# Patient Record
Sex: FEMALE | Race: WHITE | Employment: UNEMPLOYED | URBAN - METROPOLITAN AREA
[De-identification: names, ages, dates, MRNs, and addresses within clinical notes are randomized per-mention and may not be internally consistent; named-entity substitution may affect disease eponyms.]

---

## 2022-04-25 ENCOUNTER — APPOINTMENT (OUTPATIENT)
Dept: CT IMAGING | Age: 57
End: 2022-04-25

## 2022-04-25 ENCOUNTER — HOSPITAL ENCOUNTER (EMERGENCY)
Age: 57
Discharge: HOME | End: 2022-04-25
Attending: STUDENT IN AN ORGANIZED HEALTH CARE EDUCATION/TRAINING PROGRAM

## 2022-04-25 VITALS
HEART RATE: 109 BPM | SYSTOLIC BLOOD PRESSURE: 144 MMHG | BODY MASS INDEX: 26.46 KG/M2 | TEMPERATURE: 97.5 F | OXYGEN SATURATION: 99 % | HEIGHT: 64 IN | DIASTOLIC BLOOD PRESSURE: 100 MMHG | WEIGHT: 155 LBS | RESPIRATION RATE: 16 BRPM

## 2022-04-25 DIAGNOSIS — C56.9 MALIGNANT NEOPLASM OF OVARY, UNSPECIFIED LATERALITY (HCC): Primary | ICD-10-CM

## 2022-04-25 DIAGNOSIS — N39.0 URINARY TRACT INFECTION WITHOUT HEMATURIA, SITE UNSPECIFIED: ICD-10-CM

## 2022-04-25 LAB
A/G RATIO: 0.8 (ref 1.1–2.2)
ALBUMIN SERPL-MCNC: 3 G/DL (ref 3.4–5)
ALP BLD-CCNC: 73 U/L (ref 40–129)
ALT SERPL-CCNC: <5 U/L (ref 10–40)
AMMONIA: 12 UMOL/L (ref 11–51)
ANION GAP SERPL CALCULATED.3IONS-SCNC: 15 MMOL/L (ref 3–16)
AST SERPL-CCNC: 8 U/L (ref 15–37)
BACTERIA: ABNORMAL /HPF
BASOPHILS ABSOLUTE: 0.1 K/UL (ref 0–0.2)
BASOPHILS RELATIVE PERCENT: 1.4 %
BILIRUB SERPL-MCNC: <0.2 MG/DL (ref 0–1)
BILIRUBIN URINE: ABNORMAL
BLOOD, URINE: NEGATIVE
BUN BLDV-MCNC: 7 MG/DL (ref 7–20)
CALCIUM SERPL-MCNC: 8.7 MG/DL (ref 8.3–10.6)
CHLORIDE BLD-SCNC: 97 MMOL/L (ref 99–110)
CLARITY: CLEAR
CO2: 26 MMOL/L (ref 21–32)
COLOR: YELLOW
CREAT SERPL-MCNC: 0.7 MG/DL (ref 0.6–1.1)
EOSINOPHILS ABSOLUTE: 0 K/UL (ref 0–0.6)
EOSINOPHILS RELATIVE PERCENT: 0.4 %
EPITHELIAL CELLS, UA: ABNORMAL /HPF (ref 0–5)
GFR AFRICAN AMERICAN: >60
GFR NON-AFRICAN AMERICAN: >60
GLUCOSE BLD-MCNC: 92 MG/DL (ref 70–99)
GLUCOSE URINE: NEGATIVE MG/DL
HCG QUALITATIVE: NEGATIVE
HCT VFR BLD CALC: 34.6 % (ref 36–48)
HEMOGLOBIN: 11.4 G/DL (ref 12–16)
KETONES, URINE: ABNORMAL MG/DL
LEUKOCYTE ESTERASE, URINE: ABNORMAL
LIPASE: 16 U/L (ref 13–60)
LYMPHOCYTES ABSOLUTE: 1.8 K/UL (ref 1–5.1)
LYMPHOCYTES RELATIVE PERCENT: 17.5 %
MAGNESIUM: 2.2 MG/DL (ref 1.8–2.4)
MCH RBC QN AUTO: 25.7 PG (ref 26–34)
MCHC RBC AUTO-ENTMCNC: 32.9 G/DL (ref 31–36)
MCV RBC AUTO: 78 FL (ref 80–100)
MICROSCOPIC EXAMINATION: YES
MONOCYTES ABSOLUTE: 0.7 K/UL (ref 0–1.3)
MONOCYTES RELATIVE PERCENT: 6.9 %
MUCUS: ABNORMAL /LPF
NEUTROPHILS ABSOLUTE: 7.6 K/UL (ref 1.7–7.7)
NEUTROPHILS RELATIVE PERCENT: 73.8 %
NITRITE, URINE: POSITIVE
PDW BLD-RTO: 20.4 % (ref 12.4–15.4)
PH UA: 5.5 (ref 5–8)
PLATELET # BLD: 596 K/UL (ref 135–450)
PMV BLD AUTO: 6.9 FL (ref 5–10.5)
POTASSIUM REFLEX MAGNESIUM: 3.1 MMOL/L (ref 3.5–5.1)
PROTEIN UA: 30 MG/DL
RBC # BLD: 4.44 M/UL (ref 4–5.2)
RBC UA: ABNORMAL /HPF (ref 0–4)
SODIUM BLD-SCNC: 138 MMOL/L (ref 136–145)
SPECIFIC GRAVITY UA: 1.02 (ref 1–1.03)
TOTAL PROTEIN: 6.7 G/DL (ref 6.4–8.2)
URINE REFLEX TO CULTURE: ABNORMAL
URINE TYPE: ABNORMAL
UROBILINOGEN, URINE: 1 E.U./DL
WBC # BLD: 10.3 K/UL (ref 4–11)
WBC UA: ABNORMAL /HPF (ref 0–5)

## 2022-04-25 PROCEDURE — 84703 CHORIONIC GONADOTROPIN ASSAY: CPT

## 2022-04-25 PROCEDURE — 80053 COMPREHEN METABOLIC PANEL: CPT

## 2022-04-25 PROCEDURE — 6360000002 HC RX W HCPCS: Performed by: STUDENT IN AN ORGANIZED HEALTH CARE EDUCATION/TRAINING PROGRAM

## 2022-04-25 PROCEDURE — 6370000000 HC RX 637 (ALT 250 FOR IP): Performed by: STUDENT IN AN ORGANIZED HEALTH CARE EDUCATION/TRAINING PROGRAM

## 2022-04-25 PROCEDURE — 83735 ASSAY OF MAGNESIUM: CPT

## 2022-04-25 PROCEDURE — 74177 CT ABD & PELVIS W/CONTRAST: CPT

## 2022-04-25 PROCEDURE — C9113 INJ PANTOPRAZOLE SODIUM, VIA: HCPCS | Performed by: STUDENT IN AN ORGANIZED HEALTH CARE EDUCATION/TRAINING PROGRAM

## 2022-04-25 PROCEDURE — 2500000003 HC RX 250 WO HCPCS: Performed by: STUDENT IN AN ORGANIZED HEALTH CARE EDUCATION/TRAINING PROGRAM

## 2022-04-25 PROCEDURE — 99285 EMERGENCY DEPT VISIT HI MDM: CPT

## 2022-04-25 PROCEDURE — 82140 ASSAY OF AMMONIA: CPT

## 2022-04-25 PROCEDURE — 85025 COMPLETE CBC W/AUTO DIFF WBC: CPT

## 2022-04-25 PROCEDURE — 96365 THER/PROPH/DIAG IV INF INIT: CPT

## 2022-04-25 PROCEDURE — 36415 COLL VENOUS BLD VENIPUNCTURE: CPT

## 2022-04-25 PROCEDURE — 96368 THER/DIAG CONCURRENT INF: CPT

## 2022-04-25 PROCEDURE — 81001 URINALYSIS AUTO W/SCOPE: CPT

## 2022-04-25 PROCEDURE — 2580000003 HC RX 258: Performed by: STUDENT IN AN ORGANIZED HEALTH CARE EDUCATION/TRAINING PROGRAM

## 2022-04-25 PROCEDURE — 96366 THER/PROPH/DIAG IV INF ADDON: CPT

## 2022-04-25 PROCEDURE — 6360000004 HC RX CONTRAST MEDICATION: Performed by: STUDENT IN AN ORGANIZED HEALTH CARE EDUCATION/TRAINING PROGRAM

## 2022-04-25 PROCEDURE — 83690 ASSAY OF LIPASE: CPT

## 2022-04-25 PROCEDURE — 96375 TX/PRO/DX INJ NEW DRUG ADDON: CPT

## 2022-04-25 RX ORDER — OMEPRAZOLE 40 MG/1
40 CAPSULE, DELAYED RELEASE ORAL
Qty: 30 CAPSULE | Refills: 0 | Status: SHIPPED | OUTPATIENT
Start: 2022-04-25

## 2022-04-25 RX ORDER — CEFUROXIME AXETIL 250 MG/1
250 TABLET ORAL 2 TIMES DAILY
Qty: 14 TABLET | Refills: 0 | Status: SHIPPED | OUTPATIENT
Start: 2022-04-25 | End: 2022-05-02

## 2022-04-25 RX ORDER — MAGNESIUM SULFATE 1 G/100ML
1000 INJECTION INTRAVENOUS ONCE
Status: COMPLETED | OUTPATIENT
Start: 2022-04-25 | End: 2022-04-25

## 2022-04-25 RX ORDER — POTASSIUM CHLORIDE 20 MEQ/1
40 TABLET, EXTENDED RELEASE ORAL ONCE
Status: COMPLETED | OUTPATIENT
Start: 2022-04-25 | End: 2022-04-25

## 2022-04-25 RX ORDER — ONDANSETRON 4 MG/1
4 TABLET, ORALLY DISINTEGRATING ORAL 3 TIMES DAILY PRN
Qty: 21 TABLET | Refills: 0 | Status: SHIPPED | OUTPATIENT
Start: 2022-04-25

## 2022-04-25 RX ADMIN — IOPAMIDOL 75 ML: 755 INJECTION, SOLUTION INTRAVENOUS at 19:36

## 2022-04-25 RX ADMIN — FAMOTIDINE 20 MG: 10 INJECTION INTRAVENOUS at 23:00

## 2022-04-25 RX ADMIN — POTASSIUM CHLORIDE 40 MEQ: 1500 TABLET, EXTENDED RELEASE ORAL at 22:57

## 2022-04-25 RX ADMIN — CEFTRIAXONE SODIUM 1000 MG: 1 INJECTION, POWDER, FOR SOLUTION INTRAMUSCULAR; INTRAVENOUS at 21:24

## 2022-04-25 RX ADMIN — MAGNESIUM SULFATE HEPTAHYDRATE 1000 MG: 1 INJECTION, SOLUTION INTRAVENOUS at 22:03

## 2022-04-25 RX ADMIN — Medication 40 MG: at 22:57

## 2022-04-25 ASSESSMENT — PAIN - FUNCTIONAL ASSESSMENT: PAIN_FUNCTIONAL_ASSESSMENT: NONE - DENIES PAIN

## 2022-04-25 NOTE — Clinical Note
Neal Harris was seen and treated in our emergency department on 4/25/2022. She may return to work on 04/27/2022. If you have any questions or concerns, please don't hesitate to call.       Francisco Rodriguez, DO

## 2022-04-25 NOTE — ED PROVIDER NOTES
[address] ED      CHIEF COMPLAINT  Bloated (pt c/o abdominal bloating for a couple months; HX of alcoholism, has not drank in 8 months; buring to throat; pain 0/10; stomach feels tight )       HISTORY OF PRESENT ILLNESS  Leonardo Reese is a 64 y.o. female  who presents to the ED complaining of increasing abdominal swelling for the last 2 months. States that she did drink heavily in the past, but has not drank in several months. She also reports burning in her throat and stomach when she eats and drinks. She does often have emesis afterwards, usually clear and yellow in color. Denies any black or bloody stools. Does admit to constipation. Denies any fevers, chills, chest pain, shortness of breath. No significant abdominal pain. No other complaints, modifying factors or associated symptoms. I have reviewed the following from the nursing documentation. History reviewed. No pertinent past medical history. History reviewed. No pertinent surgical history. History reviewed. No pertinent family history. Social History     Socioeconomic History    Marital status:      Spouse name: Not on file    Number of children: Not on file    Years of education: Not on file    Highest education level: Not on file   Occupational History    Not on file   Tobacco Use    Smoking status: Current Every Day Smoker     Packs/day: 1.50     Types: Cigarettes    Smokeless tobacco: Never Used   Substance and Sexual Activity    Alcohol use:  Yes     Alcohol/week: 8.0 standard drinks     Types: 8 Cans of beer per week    Drug use: No    Sexual activity: Not on file   Other Topics Concern    Not on file   Social History Narrative    Not on file     Social Determinants of Health     Financial Resource Strain:     Difficulty of Paying Living Expenses: Not on file   Food Insecurity:     Worried About Running Out of Food in the Last Year: Not on file    Marlin of Food in the Last Year: Not on file Transportation Needs:     Lack of Transportation (Medical): Not on file    Lack of Transportation (Non-Medical): Not on file   Physical Activity:     Days of Exercise per Week: Not on file    Minutes of Exercise per Session: Not on file   Stress:     Feeling of Stress : Not on file   Social Connections:     Frequency of Communication with Friends and Family: Not on file    Frequency of Social Gatherings with Friends and Family: Not on file    Attends Worship Services: Not on file    Active Member of [address] [place] [address] [email or web address] or Organizations: Not on file    Attends Club or Organization Meetings: Not on file    Marital Status: Not on file   Intimate Partner Violence:     Fear of Current or Ex-Partner: Not on file    Emotionally Abused: Not on file    Physically Abused: Not on file    Sexually Abused: Not on file   Housing Stability:     Unable to Pay for Housing in the Last Year: Not on file    Number of [place] in the Last Year: Not on file    Unstable Housing in the Last Year: Not on file     No current facility-administered medications for this encounter. No current outpatient medications on file. Allergies   Allergen Reactions    Azithromycin Hives    Codeine      Causes syncope       REVIEW OF SYSTEMS  10 systems reviewed, pertinent positives per HPI otherwise noted to be negative. PHYSICAL EXAM  BP (!) 148/89   Pulse 118   Temp 97.5 °F (36.4 °C) (Oral)   Resp 18   Ht 5' 4\" (1.626 m)   Wt 155 lb (70.3 kg)   LMP 04/25/2016   SpO2 100%   BMI 26.61 kg/m²    General: Appears well. Alert  HEENT: Head atraumatic, Eyes normal inspection, PERRL. Normal ENT inspection, Pharynx normal. No signs of dehydration  NECK: Normal inspection  RESPIRATORY: Normal breath sounds. No chest wall tenderness. No respiratory distress  CVS: Heart rate and rhythm regular. No Murmurs  ABDOMEN/GI: Severely distended, non-tender  BACK: Normal inspection  EXTREMITIES: Non-Tender. Full ROM. Normal appearance.  No cystic mass in the abdomen concerning for ovarian malignancy. Discussed with on-call OB/GYN, who recommended referral to Dr. Kenton Paulino at [facility]. They will give the patient's information to the office at [facility] and the patient is given referral to Dr. Kenton Paulino. Due to her nausea and reflux, she is given Pepcid and pantoprazole in the ED. She is given a prescription for pantoprazole and Zofran. I did discuss the results in detail with the patient and her . She is agreeable with follow-up and will call the gynecologic oncology office tomorrow. During the patient's ED course, the patient was given:  Medications   magnesium sulfate 1000 mg in dextrose 5% 100 mL IVPB (1,000 mg IntraVENous New Bag 4/25/22 2203)   potassium chloride (KLOR-CON M) extended release tablet 40 mEq (has no administration in time range)   famotidine (PEPCID) 20 mg in sodium chloride (PF) 10 mL injection (has no administration in time range)   pantoprazole (PROTONIX) 40 mg in sodium chloride (PF) 10 mL injection (has no administration in time range)   iopamidol (ISOVUE-370) 76 % injection 75 mL (75 mLs IntraVENous Given 4/25/22 1936)   cefTRIAXone (ROCEPHIN) 1000 mg IVPB in 50 mL D5W minibag (1,000 mg IntraVENous New Bag 4/25/22 2124)        CLINICAL IMPRESSION  1. Malignant neoplasm of ovary, unspecified laterality ([address]    2. Urinary tract infection without hematuria, site unspecified        Blood pressure (!) 148/89, pulse 118, temperature 97.5 °F (36.4 °C), temperature source Oral, resp. rate 18, height 5' 4\" (1.626 m), weight 155 lb (70.3 kg), last menstrual period 04/25/2016, SpO2 100 %. Felicia Rod was discharged to home in stable condition. Patient was given scripts for the following medications. I counseled patient how to take these medications.    New Prescriptions    CEFUROXIME (CEFTIN) 250 MG TABLET    Take 1 tablet by mouth 2 times daily for 7 days    OMEPRAZOLE (PRILOSEC) 40 MG DELAYED RELEASE CAPSULE Take 1 capsule by mouth every morning (before breakfast)    ONDANSETRON (ZOFRAN-ODT) 4 MG DISINTEGRATING TABLET    Take 1 tablet by mouth 3 times daily as needed for Nausea or Vomiting       Follow-up with:  Victor Manuel Morelos MD  [address] [facility] [address]  [phone]    Call in 1 day        DISCLAIMER: This chart was created using Dragon dictation software. Efforts were made by me to ensure accuracy, however some errors may be present due to limitations of this technology and occasionally words are not transcribed correctly.      Anaya Mendoza DO  04/25/22 6678

## 2022-07-05 ENCOUNTER — HOSPITAL ENCOUNTER (OUTPATIENT)
Dept: PET IMAGING | Age: 57
Discharge: HOME | End: 2022-07-05
Payer: MEDICAID

## 2022-07-05 DIAGNOSIS — C56.9 MALIGNANT NEOPLASM OF OVARY, UNSPECIFIED LATERALITY (HCC): ICD-10-CM

## 2022-07-05 PROCEDURE — A9552 F18 FDG: HCPCS | Performed by: INTERNAL MEDICINE

## 2022-07-05 PROCEDURE — 78815 PET IMAGE W/CT SKULL-THIGH: CPT

## 2022-07-05 PROCEDURE — 3430000000 HC RX DIAGNOSTIC RADIOPHARMACEUTICAL: Performed by: INTERNAL MEDICINE

## 2022-07-05 RX ORDER — FLUDEOXYGLUCOSE F 18 200 MCI/ML
13.52 INJECTION, SOLUTION INTRAVENOUS
Status: COMPLETED | OUTPATIENT
Start: 2022-07-05 | End: 2022-07-05

## 2022-07-05 RX ADMIN — FLUDEOXYGLUCOSE F 18 13.52 MILLICURIE: 200 INJECTION, SOLUTION INTRAVENOUS at 12:33

## 2022-12-05 ENCOUNTER — TELEPHONE (OUTPATIENT)
Dept: PULMONOLOGY | Age: 57
End: 2022-12-05

## 2022-12-05 NOTE — TELEPHONE ENCOUNTER
[facility] please review and advise when to schedule.

## 2022-12-08 ENCOUNTER — PROCEDURE VISIT (OUTPATIENT)
Dept: AUDIOLOGY | Age: 57
End: 2022-12-08
Payer: MEDICAID

## 2022-12-08 ENCOUNTER — OFFICE VISIT (OUTPATIENT)
Dept: ENT CLINIC | Age: 57
End: 2022-12-08
Payer: MEDICAID

## 2022-12-08 VITALS — RESPIRATION RATE: 16 BRPM | BODY MASS INDEX: 25.32 KG/M2 | HEIGHT: 60 IN | TEMPERATURE: 98 F | WEIGHT: 129 LBS

## 2022-12-08 DIAGNOSIS — H91.90 HEARING DIFFICULTY, UNSPECIFIED LATERALITY: Primary | ICD-10-CM

## 2022-12-08 DIAGNOSIS — R09.82 POSTNASAL DRIP: Primary | ICD-10-CM

## 2022-12-08 DIAGNOSIS — H65.92 MIDDLE EAR EFFUSION, LEFT: ICD-10-CM

## 2022-12-08 DIAGNOSIS — H90.12 CONDUCTIVE HEARING LOSS OF LEFT EAR WITH UNRESTRICTED HEARING OF RIGHT EAR: ICD-10-CM

## 2022-12-08 DIAGNOSIS — H69.82 ETD (EUSTACHIAN TUBE DYSFUNCTION), LEFT: ICD-10-CM

## 2022-12-08 DIAGNOSIS — H90.A12 CONDUCTIVE HEARING LOSS OF LEFT EAR WITH RESTRICTED HEARING OF RIGHT EAR: Primary | ICD-10-CM

## 2022-12-08 DIAGNOSIS — H90.A21 SENSORINEURAL HEARING LOSS (SNHL) OF RIGHT EAR WITH RESTRICTED HEARING OF LEFT EAR: ICD-10-CM

## 2022-12-08 PROCEDURE — 92557 COMPREHENSIVE HEARING TEST: CPT | Performed by: AUDIOLOGIST

## 2022-12-08 PROCEDURE — 99203 OFFICE O/P NEW LOW 30 MIN: CPT | Performed by: OTOLARYNGOLOGY

## 2022-12-08 PROCEDURE — 92567 TYMPANOMETRY: CPT | Performed by: AUDIOLOGIST

## 2022-12-08 PROCEDURE — 31231 NASAL ENDOSCOPY DX: CPT | Performed by: OTOLARYNGOLOGY

## 2022-12-08 RX ORDER — FLUTICASONE PROPIONATE 50 MCG
1 SPRAY, SUSPENSION (ML) NASAL 2 TIMES DAILY
Qty: 16 G | Refills: 2 | Status: SHIPPED | OUTPATIENT
Start: 2022-12-08

## 2022-12-08 RX ORDER — SULFAMETHOXAZOLE AND TRIMETHOPRIM 400; 80 MG/1; MG/1
1 TABLET ORAL 2 TIMES DAILY
COMMUNITY

## 2022-12-08 ASSESSMENT — ENCOUNTER SYMPTOMS
WHEEZING: 0
SORE THROAT: 0
ABDOMINAL PAIN: 0
SINUS PAIN: 0
SHORTNESS OF BREATH: 0
SINUS PRESSURE: 0

## 2022-12-08 NOTE — PATIENT INSTRUCTIONS
Ear Instructions:   -Start nasal steroids twice daily  -Auto-insufflate ears (\"pop ears\") 4-5 times daily  -Will consider audiogram to determine any conductive hearing loss  -Return in 6 weeks for re-evaluation

## 2022-12-08 NOTE — PROGRESS NOTES
[address], [address], [address] [place] [address]  P: [phone]       Patient     Cecile Gambino  1965    Chief Concern     Chief Complaint   Patient presents with    New Patient     States that her hearing is muffled more so in the left ear, states that this has been an issue since the end of October. Ear aches currently, has had several rounds of ABX w/ ear infections recently. Assessment and Plan      Diagnosis Orders   1. Postnasal drip        2. ETD (Eustachian tube dysfunction), left  fluticasone (FLONASE) 50 MCG/ACT nasal spray      3. Conductive hearing loss of left ear with unrestricted hearing of right ear        4. Middle ear effusion, left          Left middle ear effusion along with conductive hearing loss - may be secondary to URI 10/2022 - likely has a component of dilatory ET dysfunction. No masses on nasal endoscopy - will start flonase BID, have her pop hear ear regularly, see her back in 6 weeks or may call if symptoms resolved. Return in about 6 weeks (around 1/19/2023). History of Present Illness     Concern for hearing loss  for the past 2 months; no otorrhea, otalgia, fevers, chills, vertigo. Had URI/pneumonia 2 months ago and this resolved on fluoroquinolone treatment. 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 Received surgery 5/11/2022, finished last chemotherapy course 11/2022. Current smoker 0.5PPD, no current EtOH. No history of chronic ear disease, tympanostomy tubes    Past Medical History     No past medical history on file. Past Surgical History     No past surgical history on file. Family History     No family history on file.     Social History     Social History     Tobacco Use    Smoking status: Every Day     Packs/day: 0.50     Types: Cigarettes    Smokeless tobacco: Never   Vaping Use    Vaping Use: Never used   Substance Use Topics    Alcohol use: Not Currently     Comment: Socially    Drug use: No        Allergies     Allergies   Allergen Reactions    Azithromycin Hives    Codeine      Causes syncope       Medications     Current Outpatient Medications   Medication Sig Dispense Refill    sulfamethoxazole-trimethoprim (BACTRIM;SEPTRA) 400-80 MG per tablet Take 1 tablet by mouth 2 times daily      fluticasone (FLONASE) 50 MCG/ACT nasal spray 1 spray by Each Nostril route 2 times daily 16 g 2    ondansetron (ZOFRAN-ODT) 4 MG disintegrating tablet Take 1 tablet by mouth 3 times daily as needed for Nausea or Vomiting 21 tablet 0    omeprazole (PRILOSEC) 40 MG delayed release capsule Take 1 capsule by mouth every morning (before breakfast) 30 capsule 0     No current facility-administered medications for this visit. Review of Systems     Review of Systems   Constitutional:  Negative for activity change, chills, diaphoresis, fatigue and fever. HENT:  Positive for hearing loss. Negative for congestion, ear discharge, ear pain, sinus pressure, sinus pain, sore throat and tinnitus. Eyes:  Negative for visual disturbance. Respiratory:  Negative for shortness of breath and wheezing. Cardiovascular:  Negative for chest pain. Gastrointestinal:  Negative for abdominal pain. Musculoskeletal:  Negative for neck pain and neck stiffness. Skin:  Negative for rash. Neurological:  Negative for dizziness, light-headedness and headaches. Hematological:  Negative for adenopathy. PhysicalExam     Vitals:    12/08/22 1443   Resp: 16   Temp: 98 °F (36.7 °C)   TempSrc: Infrared   Weight: 129 lb (58.5 kg)   Height: 5' (1.524 m)       Physical Exam  Vitals reviewed. Constitutional:       Appearance: Normal appearance. HENT:      Head: Normocephalic and atraumatic. Right Ear: Tympanic membrane, ear canal and external ear normal. There is no impacted cerumen. Left Ear: Ear canal and external ear normal. A middle ear effusion is present. There is no impacted cerumen. Ears:      Irby exam findings: Does not lateralize. Right Rinne: AC > BC. Left Rinne: AC > BC. Nose: No congestion or rhinorrhea. Mouth/Throat:      Lips: Pink. No lesions. Mouth: Mucous membranes are moist. No oral lesions. Tongue: No lesions. Tongue does not deviate from midline. Palate: No mass and lesions. Pharynx: Oropharynx is clear. Uvula midline. No oropharyngeal exudate or posterior oropharyngeal erythema. Eyes:      Extraocular Movements: Extraocular movements intact. Pupils: Pupils are equal, round, and reactive to light. Musculoskeletal:      Cervical back: Normal range of motion and neck supple. Lymphadenopathy:      Cervical: No cervical adenopathy. Neurological:      Mental Status: She is alert. Cranial Nerves: No cranial nerve deficit. Data Review        Procedure     Nasal Endoscopy    Pre OP: Left middle ear effusion, rule out nasopharyngeal mass (current smoker)  Post OP: Rhinitis    Verbal consent was received. After topical anesthesia and decongestion had been obtained using aerosolized 1% lidocaine and oxymetazoline, a 45 degree rigid endoscope was placed into both nares with the patient in a sitting position.  The following was observed:    Right Nasal Cavity and Paranasal Sinuses:  Polyp score = 0 (0 = no polyps, 1 = small polyps in middle meatus not reaching below the inferior border of the middle steven, 2 = polyps reaching below the middle border of the middle turbinate, 3= large polyps reaching the lower border of the inferior turbinate or polyps medial to the middle steven, 4= large polyps causing almost complete congestion/obstruction of the interior meatus)  Edema score = 1 (0 = absent, 1 = mild, 2 = severe)  Discharge score = 1 (0 = no discharge, 1 = clear thin discharge, 2 = thick purulent discharge)    Left Nasal Cavity and Paranasal Sinuses:    Polyp score = 0 (0 = no polyps, 1 = small polyps in middle meatus not reaching below the inferior border of the middle steven, 2 = polyps reaching below the middle border of the middle turbinate, 3= large polyps reaching the lower border of the inferior turbinate or polyps medial to the middle steven, 4= large polyps causing almost complete congestion/obstruction of the interior meatus)  Edema score = 1 (0 = absent, 1 = mild, 2 = severe)  Discharge score = 1 (0 = no discharge, 1 = clear thin discharge, 2 = thick purulent discharge)    Nasopharynx:     Eustachean tube orifices, Fossae of Rosenmuller and posterior nasopharyngeal wall clear without masses    Septum: intact and deviated   Other: The inferior and middle turbinates were examined. The middle meatus, and sphenoethmoid recess was examined bilaterally. Cultures were not obtained from the nasal passages. There were no complications. Tolerated well without complication. I attest that I was present for and did the entire procedure myself. Ibis Leonardo MD  12/8/22    Portions of this note were dictated using Dragon.  There may be linguistic errors secondary to the use of this program.

## 2022-12-08 NOTE — PATIENT INSTRUCTIONS
Good Communication Strategies    Communication can be challenging for anyone, but can be especially difficult for those with some degree of hearing loss. While we may not be able to control every factor that may lead to difficulty with communication, there are Good Communication Strategies that we can all use in our day-to-day lives. Communication takes both parties working together for it to be successful. Tips as a Listener:   Control your environment. It is important to limit the amount of background noise in the room when possible. You should also consider having a good light source in the room to best see the other person. Ask for clarification. Instead of saying \"What?\", you can use parts of what you heard to make a new question. For example, if you heard the word \"Thursday\" but not the rest of the week, you may ask \"What was that about Thursday? \" or \"What did you want to do Thursday? \". This shows the person talking that you are listening and will help them better explain what they are saying. Be an advocate for yourself. If you are hearing but not understanding, tell the other person \"I can hear you, but I need you to slow down when you speak. \"  Or if someone is facing the other direction, say \"I cannot hear you when you are not looking at me when we talk. \"       Tips as a Talker:   - Sit or stand 3 to 6 feet away to maximize audibility         -- It is unrealistic to believe someone else will fully hear your message if you are speaking from across the room or in a different room in the house   - Stay at eye level to help with visual cues   - Make sure you have the persons attention before speaking   - Use facial expressions and gestures to accentuate your message   - Raise your voice slightly (do not scream)   - Speak slowly and distinctly   - Use short, simple sentences   - Rephrase your words if the person is having a hard time understanding you    - To avoid distortion, dont speak directly into a persons ear      Some additional items that may be helpful:   - Remain patient - this is important for both parties   - Write down items that still cannot be heard/understood. You may write with pen/paper or consider typing/texting on a cell phone or smart device. - If background noise is unavoidable, try to keep yourself in a good position in the room. By sitting at a rodriguez on the side of the restaurant (preferably a corner), it will be easier to communicate than if you were sitting at a table in the middle with background noise surrounding you. Keep yourself positioned away from music speakers or heavy foot traffic.

## 2022-12-08 NOTE — PROGRESS NOTES
Hudson No   1965, 62 y.o. female   [id number]       Referring Provider: Kaye So MD  Referral Type: In an order in [address] [place] [address]    Reason for Visit: Evaluation of the cause of disorders of hearing    ADULT AUDIOLOGIC EVALUATION      Hudson No is a 62 y.o. female seen today, 12/8/2022 , for an initial audiologic evaluation. Patient was seen by Kaye So MD following today's evaluation. AUDIOLOGIC AND OTHER PERTINENT MEDICAL HISTORY:      Hudson No noted decreased hearing in the left ear since early November 2022. Medical history is reportedly significant for chemotherapy. No additional significant otologic or medical history was reported. Hudson No denied otorrhea, tinnitus, dizziness, imbalance, history of falls, history of occupational/recreational noise exposure, history of head trauma, history of ear surgery, and family history of hearing loss. Date: 12/8/2022     IMPRESSIONS:      Today's results revealed mild high-frequency sensorineural hearing loss in the right ear and mild conductive hearing loss in the left with excellent word recognition, bilaterally. Air-bone gaps > 10 dBHL noted form 500-4000Hz in the left ear. Follow medical recommendations of Kaye So MD.     ASSESSMENT AND FINDINGS:     Otoscopy revealed: Clear ear canals bilaterally    RIGHT EAR:  Hearing Sensitivity: Within normal limits through 3kHz sloping to a mild sensorineural hearing loss  Speech Recognition Threshold: 15 dB HL  Word Recognition: Excellent 96%, based on NU-6 25-word list at 55 dBHL using recorded speech stimuli. Tympanometry: Normal peak pressure and compliance, Type A tympanogram, consistent with normal middle ear function. LEFT EAR:  Hearing Sensitivity: Mild to moderate conductive hearing loss. Speech Recognition Threshold: 40 dB HL  Word Recognition: Excellent 96%, based on NU-6 25-word list at 80 dBHL masked using recorded speech stimuli.     Tympanometry: Normal peak pressure and compliance, Type A tympanogram, consistent with normal middle ear function. Reliability: Good   Transducer: Inserts    See scanned audiogram dated 12/8/2022  for results. PATIENT EDUCATION:       The following items were discussed with the patient:   - Good Communication Strategies    Educational information was shared in the After Visit Summary. RECOMMENDATIONS:                                                                                                                                                                                                                                                            The following items are recommended based on patient report and results from today's appointment:   - Continue medical follow-up with Ritesh Brandt MD.   - Retest hearing as medically indicated and/or sooner if a change in hearing is noted. - Utilize \"Good Communication Strategies\" as discussed to assist in speech understanding with communication partners.        Leelee Schultz  Audiologist    Chart CC'd to: Ritesh Brandt MD      Degree of   Hearing Sensitivity dB Range   Within Normal Limits (WNL) 0 - 20   Mild 20 - 40   Moderate 40 - 55   Moderately-Severe 55 - 70   Severe 70 - 90   Profound 90 +

## 2022-12-13 ENCOUNTER — OFFICE VISIT (OUTPATIENT)
Dept: PULMONOLOGY | Age: 57
End: 2022-12-13
Payer: MEDICAID

## 2022-12-13 ENCOUNTER — TELEPHONE (OUTPATIENT)
Dept: PULMONOLOGY | Age: 57
End: 2022-12-13

## 2022-12-13 VITALS
BODY MASS INDEX: 26.46 KG/M2 | WEIGHT: 134.8 LBS | HEIGHT: 60 IN | DIASTOLIC BLOOD PRESSURE: 80 MMHG | OXYGEN SATURATION: 96 % | SYSTOLIC BLOOD PRESSURE: 122 MMHG | TEMPERATURE: 97.2 F | HEART RATE: 95 BPM | RESPIRATION RATE: 16 BRPM

## 2022-12-13 DIAGNOSIS — F17.200 TOBACCO DEPENDENCE: ICD-10-CM

## 2022-12-13 DIAGNOSIS — J98.4 PULMONARY CAVITARY LESION: Primary | ICD-10-CM

## 2022-12-13 PROCEDURE — 99204 OFFICE O/P NEW MOD 45 MIN: CPT | Performed by: STUDENT IN AN ORGANIZED HEALTH CARE EDUCATION/TRAINING PROGRAM

## 2022-12-13 ASSESSMENT — ENCOUNTER SYMPTOMS
BACK PAIN: 0
SORE THROAT: 0
ABDOMINAL PAIN: 0
SHORTNESS OF BREATH: 0
VOMITING: 0
EYE REDNESS: 0
APNEA: 0
EYE DISCHARGE: 0
WHEEZING: 0
TROUBLE SWALLOWING: 0
NAUSEA: 0
STRIDOR: 0
EYE PAIN: 0
CHEST TIGHTNESS: 0
ABDOMINAL DISTENTION: 0
DIARRHEA: 0
CONSTIPATION: 0
COLOR CHANGE: 0
COUGH: 0
EYE ITCHING: 0

## 2022-12-13 NOTE — PROGRESS NOTES
MA Communication:   The following orders are received by verbal communication from Yannick Rondon MD    Orders include:  2 month f/u

## 2022-12-13 NOTE — PROGRESS NOTES
[address] Visit   [address] [facility] [address]  Rachana Lubin Remberto  [phone]    Chief Complaint/Referring Provider:  Patient is being seen at the request of Dr. Dima Adams for a consultation for cavitary lesion    Presenting HPI:   Patient is a 35-year-old female with significant past medical history of ovarian cancer that presents to [facility] pulmonary clinic for evaluation of abnormal CT scan. Patient reports at the end of April she was having fatigue and weakness. She also reported that she was having abdominal pain, nausea, and bloating. She was unable to eat and was malnourished. She presented to [address] [place] [address] and was found to have abdominal mass. She was diagnosed with ovarian cancer and had surgery and chemotherapy. She was also found to have a cavitary lesion in the right upper lobe in May 2022 on a CT chest with contrast at [facility].  She had a PET CT scan in July 2022 in which it was noted that the cavitary lesion had decreased in size. Patient reports that in October 2022 she was having head congestion, wheezing, cough with productive sputum, and shortness of breath. She was treated for pneumonia with Levaquin. Patient denies any fever, chills, chest pain, shortness of breath, cough with productive sputum, hemoptysis. She is currently not working, denies any occupational exposures. She smokes half a pack per day for the past 25 years, no illicit drug use, no alcohol use. She denies any household exposures. No past medical history on file. No past surgical history on file.     Allergies   Allergen Reactions    Azithromycin Hives    Codeine      Causes syncope       Medication listwas reviewed and updated as needed in [facility]    Social History     Socioeconomic History    Marital status:      Spouse name: Not on file    Number of children: Not on file    Years of education: Not on file    Highest education level: Not on file   Occupational History Not on file   Tobacco Use    Smoking status: Every Day     Packs/day: 0.50     Types: Cigarettes    Smokeless tobacco: Never   Vaping Use    Vaping Use: Never used   Substance and Sexual Activity    Alcohol use: Not Currently     Comment: Socially    Drug use: No    Sexual activity: Not on file   Other Topics Concern    Not on file   Social History Narrative    Not on file     Social Determinants of Health     Financial Resource Strain: Not on file   Food Insecurity: Not on file   Transportation Needs: Not on file   Physical Activity: Not on file   Stress: Not on file   Social Connections: Not on file   Intimate Partner Violence: Not on file   Housing Stability: Not on file       No family history on file. Review of Systems: CompleteReview of system reviewed with patient and noted on attached review of system sheet. Review of Systems   Constitutional:  Negative for activity change, appetite change, chills, diaphoresis, fatigue and fever. HENT:  Negative for congestion, sore throat and trouble swallowing. Eyes:  Negative for pain, discharge, redness and itching. Respiratory:  Negative for apnea, cough, chest tightness, shortness of breath, wheezing and stridor. Cardiovascular:  Negative for chest pain, palpitations and leg swelling. Gastrointestinal:  Negative for abdominal distention, abdominal pain, constipation, diarrhea, nausea and vomiting. Endocrine: Negative for polydipsia, polyphagia and polyuria. Genitourinary:  Negative for difficulty urinating. Musculoskeletal:  Negative for back pain, myalgias and neck pain. Skin:  Negative for color change. Neurological:  Negative for dizziness, weakness and light-headedness. Psychiatric/Behavioral:  Negative for agitation and behavioral problems.       Physical Exam:  Vitals:    12/13/22 1104   BP: 122/80   Pulse: 95   Resp: 16   Temp: 97.2 °F (36.2 °C)   SpO2: 96%        Physical Exam  Constitutional:       General: She is not in acute distress. Appearance: She is not toxic-appearing. HENT:      Head: Normocephalic and atraumatic. Comments: Dentition no abnormalities seen. Nose: Nose normal.      Mouth/Throat:      Pharynx: No oropharyngeal exudate. Eyes:      General: No scleral icterus. Right eye: No discharge. Left eye: No discharge. Cardiovascular:      Rate and Rhythm: Normal rate and regular rhythm. Heart sounds: No murmur heard. No friction rub. No gallop. Pulmonary:      Effort: Pulmonary effort is normal. No respiratory distress. Breath sounds: No wheezing or rales. Abdominal:      General: Abdomen is flat. Bowel sounds are normal.      Palpations: Abdomen is soft. Musculoskeletal:         General: Normal range of motion. Cervical back: Normal range of motion. Skin:     General: Skin is warm and dry. Neurological:      General: No focal deficit present. Mental Status: She is alert and oriented to person, place, and time. Psychiatric:         Mood and Affect: Mood normal.        Data:     Imaging    CT Chest:   CT chest w/ contrast 5/10/22  Impression    IMPRESSION:   1. Cavitary mass in the peripheral aspect of the right upper lobe adjacent to a lung nodule. These findings could reflect metastatic disease with possible superimposed infection. 2. Large mass in the upper abdomen causing profound mass effect. 3. Ill-defined low-density lesion in the central aspect of the liver, suspicious for metastatic disease. 4. Ascites. PET/CT 7/5/22  Impression   Status post resection of complex cystic mass as compared to prior examination   dated 04/25/2022 and near resolution of ascites. Induration is demonstrated   of omental fat, which could be due to fluid overload or carcinomatosis,   though not markedly FDG avid. Continued attention on CT follow-up is   recommended.        2.0 cm thick walled cavitary lesion within the right upper lobe with mild   activity, which may reflect sequela of prior infection, lung malignancy, or   metastasis though no additional similar findings are seen elsewhere within   the lungs. Comparison with any outside prior would be helpful to establish   stability. PET/CT 11/22/22  No evidence of FDG avid malignancy or metastatic disease. Cavitary lesion in the right upper lung appears decreased in wall thickness compared to previous exam.  Findings could represent inflammatory infectious abnormality. Follow-up CT chest in 3 months recommended. No suspicious peritoneal activity identified    CXR: None    ECHO: None    PFT: None      Assessment:  Encounter Diagnoses   Name Primary? Pulmonary cavitary lesion Yes    Tobacco dependence      Plan:   - Reviewed PET/CT from 11/2022 and 7/2022 with stability in cavitary lesion. It is thin-walled and likely cannot be biopsied. Continue with CT monitoring,which she has scheduled for 2/2023. Will send for ANCA, fungal, respiratory culture, AFB to rule out infectious and vasculitic etiology of cavitary lesion.   - If having infectious symptoms, will schedule for bronchoscopy   - Discussed smoking cessation. Pt wants to quit smoking on her own, does not want medications. Goal is to quit completely by next visit.   - f/u in 3 months    Mike Manning MD  [facility] Pul[facility] Critical Care

## 2022-12-13 NOTE — PATIENT INSTRUCTIONS
Remember to bring a list of pulmonary medications and any CPAP or BiPAP machines to your next appointment with the office. Please keep all of your future appointments scheduled by [facility] [place] [facility] office. Out of respect for other patients and providers, you may be asked to reschedule your appointment if you arrive later than your scheduled appointment time. Appointments cancelled less than 24hrs in advance will be considered a no show. Patients with three missed appointments within 1 year or four missed appointments within 2 years can be dismissed from the practice. Please be aware that our physicians are required to work in the Intensive Care Unit at [facility].  Your appointment may need to be rescheduled if they are designated to work during your appointment time. You may receive a survey regarding the care you received during your visit. Your input is valuable to us. We encourage you to complete and return your survey. We hope you will choose us in the future for your healthcare needs. Pt instructed of all future appointment dates & times, including radiology, labs, procedures & referrals. If procedures were scheduled preparation instructions provided. Instructions on future appointments with [facility] Pulmonary were given. Render Path Notes In Note?: No Wound Care: Petrolatum Information: Selecting Yes will display possible errors in your note based on the variables you have selected. This validation is only offered as a suggestion for you. PLEASE NOTE THAT THE VALIDATION TEXT WILL BE REMOVED WHEN YOU FINALIZE YOUR NOTE. IF YOU WANT TO FAX A PRELIMINARY NOTE YOU WILL NEED TO TOGGLE THIS TO 'NO' IF YOU DO NOT WANT IT IN YOUR FAXED NOTE. Was A Bandage Applied: Yes Size Of Lesion In Cm: 0 Anesthesia Volume In Cc (Will Not Render If 0): 0.5 Depth Of Biopsy: dermis Cryotherapy Text: The wound bed was treated with cryotherapy after the biopsy was performed. Detail Level: Detailed Dressing: bandage Biopsy Type: H and E Hemostasis: Drysol Biopsy Method: Dermablade Notification Instructions: Patient will be notified of biopsy results. However, patient instructed to call the office if not contacted within 2 weeks. Post-Care Instructions: I reviewed with the patient in detail post-care instructions. Patient is to keep the biopsy site dry overnight, and then apply bacitracin twice daily until healed. Patient may apply hydrogen peroxide soaks to remove any crusting. Silver Nitrate Text: The wound bed was treated with silver nitrate after the biopsy was performed. Electrodesiccation And Curettage Text: The wound bed was treated with electrodesiccation and curettage after the biopsy was performed. Type Of Destruction Used: Curettage Anesthesia Type: 1% lidocaine with epinephrine Billing Type: Third-Party Bill Curettage Text: The wound bed was treated with curettage after the biopsy was performed. Electrodesiccation Text: The wound bed was treated with electrodesiccation after the biopsy was performed. Consent: Written consent was obtained and risks were reviewed including but not limited to scarring, infection, bleeding, scabbing, incomplete removal, nerve damage and allergy to anesthesia.

## 2022-12-30 ENCOUNTER — HOSPITAL ENCOUNTER (OUTPATIENT)
Age: 57
Discharge: HOME | End: 2022-12-30
Payer: MEDICAID

## 2022-12-30 DIAGNOSIS — J98.4 PULMONARY CAVITARY LESION: ICD-10-CM

## 2022-12-30 PROCEDURE — 87556 M.TUBERCULO DNA AMP PROBE: CPT

## 2022-12-30 PROCEDURE — 83516 IMMUNOASSAY NONANTIBODY: CPT

## 2022-12-30 PROCEDURE — 87116 MYCOBACTERIA CULTURE: CPT

## 2022-12-30 PROCEDURE — 87070 CULTURE OTHR SPECIMN AEROBIC: CPT

## 2022-12-30 PROCEDURE — 87205 SMEAR GRAM STAIN: CPT

## 2022-12-30 PROCEDURE — 87206 SMEAR FLUORESCENT/ACID STAI: CPT

## 2022-12-30 PROCEDURE — 86612 BLASTOMYCES ANTIBODY: CPT

## 2022-12-30 PROCEDURE — 87798 DETECT AGENT NOS DNA AMP: CPT

## 2022-12-30 PROCEDURE — 87385 HISTOPLASMA CAPSUL AG IA: CPT

## 2023-01-01 LAB
AFB SMEAR: NORMAL
CULTURE, RESPIRATORY: NORMAL
GRAM STAIN RESULT: NORMAL
MTB COMPLEX PCR: NORMAL
MYELOPEROXIDASE AB: 0 AU/ML (ref 0–19)
SERINE PROTEASE 3 AB: 35 AU/ML (ref 0–19)

## 2023-02-01 ENCOUNTER — OFFICE VISIT (OUTPATIENT)
Dept: PULMONOLOGY | Age: 58
End: 2023-02-01
Payer: MEDICAID

## 2023-02-01 VITALS
WEIGHT: 143 LBS | RESPIRATION RATE: 16 BRPM | OXYGEN SATURATION: 97 % | SYSTOLIC BLOOD PRESSURE: 139 MMHG | DIASTOLIC BLOOD PRESSURE: 97 MMHG | BODY MASS INDEX: 28.07 KG/M2 | TEMPERATURE: 98.6 F | HEART RATE: 85 BPM | HEIGHT: 60 IN

## 2023-02-01 DIAGNOSIS — J98.4 PULMONARY CAVITARY LESION: Primary | ICD-10-CM

## 2023-02-01 DIAGNOSIS — F17.200 TOBACCO DEPENDENCE: ICD-10-CM

## 2023-02-01 PROCEDURE — 99213 OFFICE O/P EST LOW 20 MIN: CPT | Performed by: STUDENT IN AN ORGANIZED HEALTH CARE EDUCATION/TRAINING PROGRAM

## 2023-02-01 ASSESSMENT — ENCOUNTER SYMPTOMS
DIARRHEA: 0
SHORTNESS OF BREATH: 0
EYE PAIN: 0
ABDOMINAL PAIN: 0
STRIDOR: 0
CONSTIPATION: 0
EYE ITCHING: 0
VOMITING: 0
COLOR CHANGE: 0
WHEEZING: 0
BACK PAIN: 0
TROUBLE SWALLOWING: 0
SORE THROAT: 0
ABDOMINAL DISTENTION: 0
EYE REDNESS: 0
NAUSEA: 0
EYE DISCHARGE: 0
COUGH: 0

## 2023-02-01 NOTE — PROGRESS NOTES
[facility] Pulmonary Follow-up  [place], [address]  [phone]        Angelic Villalta (: 1965 ) is a 62 y.o. female here for an evaluation of   Chief Complaint   Patient presents with    Follow-up     2 month follow up Pulmonary Cavitary Lesion        SUBJECTIVE/OBJECTIVE:  Patient is a 78-year-old female with significant past medical history of ovarian cancer that presents to [facility] pulmonary clinic fo f/u visit. Patient has no complaints today. She denies any shortness of breath, fever, chills, nausea, vomiting, abdominal pain. She is scheduled for her CT scan on 2023 to see her cavitary lesion. She is still smoking 2 cigarettes/day. She still wants to quit on her own. She presented to [facility] and was found to have abdominal mass. She was diagnosed with ovarian cancer and had surgery and chemotherapy. She was also found to have a cavitary lesion in the right upper lobe in May 2022 on a CT chest with contrast at [facility].  She had a PET CT scan in 2022 in which it was noted that the cavitary lesion had decreased in size. Review of Systems   Constitutional:  Negative for activity change, appetite change, chills, diaphoresis and fatigue. HENT:  Negative for congestion, sore throat and trouble swallowing. Eyes:  Negative for pain, discharge, redness and itching. Respiratory:  Negative for cough, shortness of breath, wheezing and stridor. Cardiovascular:  Negative for chest pain, palpitations and leg swelling. Gastrointestinal:  Negative for abdominal distention, abdominal pain, constipation, diarrhea, nausea and vomiting. Endocrine: Negative for polydipsia, polyphagia and polyuria. Genitourinary:  Negative for difficulty urinating. Musculoskeletal:  Negative for back pain, myalgias and neck pain. Skin:  Negative for color change. Neurological:  Negative for dizziness, weakness and light-headedness. Psychiatric/Behavioral:  Negative for agitation and behavioral problems. Vitals:    02/01/23 1351   BP: (!) 139/97   Site: Left Upper Arm   Position: Sitting   Cuff Size: Medium Adult   Pulse: 85   Resp: 16   Temp: 98.6 °F (37 °C)   TempSrc: Temporal   SpO2: 97%   Weight: 143 lb (64.9 kg)   Height: 5' (1.524 m)        Physical Exam  Constitutional:       General: She is not in acute distress. Appearance: She is not toxic-appearing. HENT:      Head: Normocephalic and atraumatic. Nose: Nose normal.      Mouth/Throat:      Pharynx: No oropharyngeal exudate. Eyes:      General: No scleral icterus. Right eye: No discharge. Left eye: No discharge. Cardiovascular:      Rate and Rhythm: Normal rate and regular rhythm. Heart sounds: No murmur heard. No friction rub. No gallop. Pulmonary:      Effort: Pulmonary effort is normal. No respiratory distress. Breath sounds: No wheezing or rales. Abdominal:      General: Abdomen is flat. Bowel sounds are normal.      Palpations: Abdomen is soft. Musculoskeletal:         General: Normal range of motion. Cervical back: Normal range of motion. Skin:     General: Skin is warm and dry. Neurological:      General: No focal deficit present. Mental Status: She is alert and oriented to person, place, and time. Psychiatric:         Mood and Affect: Mood normal.          Data  Imaging     CT Chest:   CT chest w/ contrast 5/10/22  Impression     IMPRESSION:   1. Cavitary mass in the peripheral aspect of the right upper lobe adjacent to a lung nodule. These findings could reflect metastatic disease with possible superimposed infection. 2. Large mass in the upper abdomen causing profound mass effect. 3. Ill-defined low-density lesion in the central aspect of the liver, suspicious for metastatic disease. 4. Ascites.       PET/CT 7/5/22  Impression   Status post resection of complex cystic mass as compared to prior examination   dated 04/25/2022 and near resolution of ascites. Induration is demonstrated   of omental fat, which could be due to fluid overload or carcinomatosis,   though not markedly FDG avid. Continued attention on CT follow-up is   recommended. 2.0 cm thick walled cavitary lesion within the right upper lobe with mild   activity, which may reflect sequela of prior infection, lung malignancy, or   metastasis though no additional similar findings are seen elsewhere within   the lungs. Comparison with any outside prior would be helpful to establish   stability. PET/CT 11/22/22  No evidence of FDG avid malignancy or metastatic disease. Cavitary lesion in the right upper lung appears decreased in wall thickness compared to previous exam.  Findings could represent inflammatory infectious abnormality. Follow-up CT chest in 3 months recommended. No suspicious peritoneal activity identified     CXR: None     ECHO: None     PFT: None      ASSESSMENT:   Diagnosis Orders   1. Pulmonary cavitary lesion        2. Tobacco dependence  nicotine (NICODERM CQ) 7 MG/24HR    nicotine polacrilex (NICORETTE) 2 MG gum        Plan:  - Reviewed PET/CT from 11/2022 and 7/2022 with stability in cavitary lesion. It is thin-walled and likely cannot be biopsied. Will have CT chest w/o con on 2/20/23 and can f/u results. - Histo and blasto negative, respiratory and AFB cultures negative  - If having infectious symptoms, will schedule for bronchoscopy   - Discussed smoking cessation. Ordered nicotine patches/gum, but patient still wants to quit on her own.    - f/u in 6 months or sooner depending on CT results    Justo Donnelly MD

## 2023-02-01 NOTE — PATIENT INSTRUCTIONS
Remember to bring a list of pulmonary medications and any CPAP or BiPAP machines to your next appointment with the office. Please keep all of your future appointments scheduled by [facility] office. Out of respect for other patients and providers, you may be asked to reschedule your appointment if you arrive later than your scheduled appointment time. Appointments cancelled less than 24hrs in advance will be considered a no show. Patients with three missed appointments within 1 year or four missed appointments within 2 years can be dismissed from the practice. Please be aware that our physicians are required to work in the Intensive Care Unit at [facility].  Your appointment may need to be rescheduled if they are designated to work during your appointment time. You may receive a survey regarding the care you received during your visit. Your input is valuable to us. We encourage you to complete and return your survey. We hope you will choose us in the future for your healthcare needs. Pt instructed of all future appointment dates & times, including radiology, labs, procedures & referrals. If procedures were scheduled preparation instructions provided. Instructions on future appointments with [facility] Pulmonary were given. MA Communication:   The following orders are received by verbal communication from Nayana Pelayo MD    August 14,2023  @ 1:40pm

## 2023-02-02 ENCOUNTER — PROCEDURE VISIT (OUTPATIENT)
Dept: AUDIOLOGY | Age: 58
End: 2023-02-02

## 2023-02-02 ENCOUNTER — OFFICE VISIT (OUTPATIENT)
Dept: ENT CLINIC | Age: 58
End: 2023-02-02

## 2023-02-02 VITALS
WEIGHT: 143 LBS | RESPIRATION RATE: 16 BRPM | HEIGHT: 60 IN | DIASTOLIC BLOOD PRESSURE: 88 MMHG | TEMPERATURE: 97 F | HEART RATE: 83 BPM | SYSTOLIC BLOOD PRESSURE: 145 MMHG | BODY MASS INDEX: 28.07 KG/M2

## 2023-02-02 DIAGNOSIS — H69.82 ETD (EUSTACHIAN TUBE DYSFUNCTION), LEFT: ICD-10-CM

## 2023-02-02 DIAGNOSIS — H90.12 CONDUCTIVE HEARING LOSS OF LEFT EAR WITH UNRESTRICTED HEARING OF RIGHT EAR: ICD-10-CM

## 2023-02-02 DIAGNOSIS — H90.A21 SENSORINEURAL HEARING LOSS (SNHL) OF RIGHT EAR WITH RESTRICTED HEARING OF LEFT EAR: Primary | ICD-10-CM

## 2023-02-02 DIAGNOSIS — H90.3 SENSORINEURAL HEARING LOSS (SNHL), BILATERAL: Primary | ICD-10-CM

## 2023-02-02 PROCEDURE — 99213 OFFICE O/P EST LOW 20 MIN: CPT | Performed by: OTOLARYNGOLOGY

## 2023-02-02 PROCEDURE — 92557 COMPREHENSIVE HEARING TEST: CPT | Performed by: AUDIOLOGIST

## 2023-02-02 PROCEDURE — 92567 TYMPANOMETRY: CPT | Performed by: AUDIOLOGIST

## 2023-02-02 ASSESSMENT — ENCOUNTER SYMPTOMS
SORE THROAT: 0
SHORTNESS OF BREATH: 0
SINUS PRESSURE: 0
SINUS PAIN: 0
ABDOMINAL PAIN: 0
WHEEZING: 0

## 2023-02-02 NOTE — PROGRESS NOTES
Y[address], [address], [address] [place] [address]  P: [phone]       Patient     Howard Baca  1965    Chief Concern     Chief Complaint   Patient presents with    Follow-up     States that she is here today to discuss hearing lost post-chemo       Assessment and Plan      Diagnosis Orders   1. Sensorineural hearing loss (SNHL), bilateral          Left middle ear effusion along with conductive hearing loss - may be secondary to URI 10/2022 - likely has a component of dilatory ET dysfunction. No masses on nasal endoscopy - started flonase BID with improvement in hearing - audiometric testing today with improvement in hearing losses however continued left sided low frequency asymmetry at 500Hz . Will plan for yearly audiogram, she is to call back with any further worsened hearing loss. No follow-ups on file. History of Present Illness     Concern for hearing loss  for the past 2 months; no otorrhea, otalgia, fevers, chills, vertigo. Had URI/pneumonia 2 months ago and this resolved on fluoroquinolone treatment. 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 Received surgery 5/11/2022, finished last chemotherapy course 11/2022. Current smoker 0.5PPD, no current EtOH. No history of chronic ear disease, tympanostomy tubes    Interval History 2/2/2023: States improvement in hearing loss 4 weeks ago. No otorrhea, otalgia. Left ear is popping intermittently. Continuing flonase. Past Medical History     No past medical history on file. Past Surgical History     No past surgical history on file. Family History     No family history on file.     Social History     Social History     Tobacco Use    Smoking status: Every Day     Packs/day: 0.25     Types: Cigarettes    Smokeless tobacco: Never    Tobacco comments:     Working on quitting- States she is cutting back (2/2/23)   Vaping Use    Vaping Use: Never used   Substance Use Topics Alcohol use: Not Currently     Comment: Socially    Drug use: No        Allergies     Allergies   Allergen Reactions    Azithromycin Hives    Codeine Other (See Comments)     Causes syncope  Passes out  \"I pass out. \"         Medications     Current Outpatient Medications   Medication Sig Dispense Refill    nicotine (NICODERM CQ) 7 MG/24HR Place 1 patch onto the skin daily for 14 days 14 patch 3    nicotine polacrilex (NICORETTE) 2 MG gum Take 1 each by mouth as needed for Smoking cessation 110 each 3    sulfamethoxazole-trimethoprim (BACTRIM;SEPTRA) 400-80 MG per tablet Take 1 tablet by mouth 2 times daily      fluticasone (FLONASE) 50 MCG/ACT nasal spray 1 spray by Each Nostril route 2 times daily 16 g 2    ondansetron (ZOFRAN-ODT) 4 MG disintegrating tablet Take 1 tablet by mouth 3 times daily as needed for Nausea or Vomiting 21 tablet 0    omeprazole (PRILOSEC) 40 MG delayed release capsule Take 1 capsule by mouth every morning (before breakfast) 30 capsule 0     No current facility-administered medications for this visit. Review of Systems     Review of Systems   Constitutional:  Negative for activity change, chills, diaphoresis, fatigue and fever. HENT:  Positive for hearing loss. Negative for congestion, ear discharge, ear pain, sinus pressure, sinus pain, sore throat and tinnitus. Eyes:  Negative for visual disturbance. Respiratory:  Negative for shortness of breath and wheezing. Cardiovascular:  Negative for chest pain. Gastrointestinal:  Negative for abdominal pain. Musculoskeletal:  Negative for neck pain and neck stiffness. Skin:  Negative for rash. Neurological:  Negative for dizziness, light-headedness and headaches. Hematological:  Negative for adenopathy.        PhysicalExam     Vitals:    02/02/23 0933   BP: (!) 145/88   Site: Left Upper Arm   Position: Sitting   Cuff Size: Medium Adult   Pulse: 83   Resp: 16   Temp: 97 °F (36.1 °C)   TempSrc: Infrared   Weight: 143 lb (64.9 kg)   Height: 5' (1.524 m)       Physical Exam  Vitals reviewed. Constitutional:       Appearance: Normal appearance. HENT:      Head: Normocephalic and atraumatic. Right Ear: Tympanic membrane, ear canal and external ear normal. No middle ear effusion. There is no impacted cerumen. Left Ear: Ear canal and external ear normal.  No middle ear effusion. There is no impacted cerumen. Ears:      Irby exam findings: Does not lateralize. Right Rinne: AC > BC. Left Rinne: AC > BC. Nose: No congestion or rhinorrhea. Mouth/Throat:      Lips: Pink. No lesions. Mouth: Mucous membranes are moist. No oral lesions. Tongue: No lesions. Tongue does not deviate from midline. Palate: No mass and lesions. Pharynx: Oropharynx is clear. Uvula midline. No oropharyngeal exudate or posterior oropharyngeal erythema. Eyes:      Extraocular Movements: Extraocular movements intact. Pupils: Pupils are equal, round, and reactive to light. Musculoskeletal:      Cervical back: Normal range of motion and neck supple. Lymphadenopathy:      Cervical: No cervical adenopathy. Neurological:      Mental Status: She is alert. Cranial Nerves: No cranial nerve deficit. Data Review                  Procedure     Radha Murhpy MD  2/2/23    Portions of this note were dictated using Dragon.  There may be linguistic errors secondary to the use of this program.

## 2023-02-02 NOTE — PROGRESS NOTES
Tessie Owens   1965, 62 y.o. female   [id number]       Referring Provider: Taryn Virgen MD  Referral Type: In an order in [address] [place] [address]    Reason for Visit: Determination of the effect of medication, surgery, or other treatment. ADULT AUDIOLOGIC EVALUATION      Tessie Owens is a 62 y.o. female seen today, 2/2/2023 , for a recheck audiologic evaluation. Patient was seen by Taryn Virgen MD following today's evaluation. AUDIOLOGIC AND OTHER PERTINENT MEDICAL HISTORY:      Tessie Owens reports improvement in her symptoms since her last appointment. No complaints or concerns reported. Date: 2/2/2023     History from previous audiologic evaluation on 12/8/2022:  Tessie Owens noted decreased hearing in the left ear since early November 2022. Medical history is reportedly significant for chemotherapy. No additional significant otologic or medical history was reported. Today's results revealed mild high-frequency sensorineural hearing loss in the right ear and mild conductive hearing loss in the left with excellent word recognition, bilaterally. Air-bone gaps > 10 dBHL noted form 500-4000Hz in the left ear. Leelee Tiwari    IMPRESSIONS:      Today's results revealed improvement in hearing sensitivity in the left ear. Excellent speech understanding when in quiet. Tympanometry indicates normal middle ear function. Follow medical recommendations of Taryn Virgen MD.     ASSESSMENT AND FINDINGS:     Otoscopy unremarkable. RIGHT EAR:  Hearing Sensitivity: Hearing within normal limits through [facility] then sloping to a mild hearing loss. Speech Recognition Threshold: 15 dB HL  Word Recognition: Excellent 100%, based on NU-6 25-word list at 55 dBHL using recorded speech stimuli. Tympanometry: Normal peak pressure and compliance, Type A tympanogram, consistent with normal middle ear function. Volume 1.1 cm3, Peak -11 daPa, 0.42 mmho.       LEFT EAR:  Hearing Sensitivity: A mild low frequency sensorineural hearing loss rising to normal/borderline normal hearing through [facility] then sloping to a mild hearing loss. Speech Recognition Threshold: 20 dB HL  Word Recognition: Excellent 100%, based on NU-6 25-word list at 60 dBHL using recorded speech stimuli. Tympanometry: Normal peak pressure and compliance, Type A tympanogram, consistent with normal middle ear function. Volume 1.1 cm3, Peak -24 daPa, 0.32 mmho. Reliability: Good   Transducer: Headphones    See scanned audiogram dated 2/2/2023 for results. PATIENT EDUCATION:       The following items were discussed with the patient:   - Good Communication Strategies    Educational information was shared in the After Visit Summary. RECOMMENDATIONS:                                                                                                                                                                                                                                                            The following items are recommended based on patient report and results from today's appointment:   - Continue medical follow-up with Qasim Abbott MD.   - Retest hearing as medically indicated and/or sooner if a change in hearing is noted. - Utilize \"Good Communication Strategies\" as discussed to assist in speech understanding with communication partners.        Leelee Rodrigues  Audiologist        Degree of   Hearing Sensitivity dB Range   Within Normal Limits (WNL) 0 - 20   Mild 20 - 40   Moderate 40 - 55   Moderately-Severe 55 - 70   Severe 70 - 90   Profound 90 +

## 2023-03-03 ENCOUNTER — OFFICE VISIT (OUTPATIENT)
Dept: FAMILY MEDICINE CLINIC | Age: 58
End: 2023-03-03

## 2023-03-03 VITALS
OXYGEN SATURATION: 94 % | WEIGHT: 145.8 LBS | BODY MASS INDEX: 28.47 KG/M2 | HEART RATE: 75 BPM | SYSTOLIC BLOOD PRESSURE: 110 MMHG | DIASTOLIC BLOOD PRESSURE: 66 MMHG

## 2023-03-03 DIAGNOSIS — Z76.89 ENCOUNTER TO ESTABLISH CARE: Primary | ICD-10-CM

## 2023-03-03 DIAGNOSIS — R91.8 LUNG MASS: ICD-10-CM

## 2023-03-03 DIAGNOSIS — C56.1: ICD-10-CM

## 2023-03-03 PROBLEM — D27.0 MUCINOUS CYSTADENOMA OF RIGHT OVARY: Status: ACTIVE | Noted: 2022-05-11

## 2023-03-03 PROBLEM — R68.81 EARLY SATIETY: Status: ACTIVE | Noted: 2023-03-03

## 2023-03-03 PROBLEM — C56.9: Status: ACTIVE | Noted: 2023-03-03

## 2023-03-03 PROBLEM — H91.90 HEARING LOSS: Status: ACTIVE | Noted: 2023-03-03

## 2023-03-03 PROBLEM — N95.0 POSTMENOPAUSAL BLEEDING: Status: ACTIVE | Noted: 2023-03-03

## 2023-03-03 PROBLEM — R30.0 DYSURIA: Status: ACTIVE | Noted: 2023-03-03

## 2023-03-03 PROBLEM — R12 HEARTBURN: Status: ACTIVE | Noted: 2023-03-03

## 2023-03-03 PROBLEM — H66.90 OTITIS MEDIA: Status: ACTIVE | Noted: 2023-03-03

## 2023-03-03 PROBLEM — M89.8X9 BONE PAIN: Status: ACTIVE | Noted: 2023-03-03

## 2023-03-03 PROBLEM — C78.6 MALIGNANT NEOPLASM METASTATIC TO PERITONEUM (HCC): Status: ACTIVE | Noted: 2022-08-17

## 2023-03-03 PROBLEM — R19.00 PELVIC MASS: Status: ACTIVE | Noted: 2023-03-03

## 2023-03-03 RX ORDER — PANTOPRAZOLE SODIUM 40 MG/1
40 TABLET, DELAYED RELEASE ORAL
Qty: 90 TABLET | Refills: 1 | Status: SHIPPED | OUTPATIENT
Start: 2023-03-03

## 2023-03-03 RX ORDER — PANTOPRAZOLE SODIUM 40 MG/1
TABLET, DELAYED RELEASE ORAL
COMMUNITY
Start: 2022-09-29 | End: 2023-03-03

## 2023-03-03 SDOH — ECONOMIC STABILITY: INCOME INSECURITY: HOW HARD IS IT FOR YOU TO PAY FOR THE VERY BASICS LIKE FOOD, HOUSING, MEDICAL CARE, AND HEATING?: NOT HARD AT ALL

## 2023-03-03 SDOH — ECONOMIC STABILITY: FOOD INSECURITY: WITHIN THE PAST 12 MONTHS, THE FOOD YOU BOUGHT JUST DIDN'T LAST AND YOU DIDN'T HAVE MONEY TO GET MORE.: NEVER TRUE

## 2023-03-03 SDOH — ECONOMIC STABILITY: HOUSING INSECURITY
IN THE LAST 12 MONTHS, WAS THERE A TIME WHEN YOU DID NOT HAVE A STEADY PLACE TO SLEEP OR SLEPT IN A SHELTER (INCLUDING NOW)?: NO

## 2023-03-03 SDOH — ECONOMIC STABILITY: FOOD INSECURITY: WITHIN THE PAST 12 MONTHS, YOU WORRIED THAT YOUR FOOD WOULD RUN OUT BEFORE YOU GOT MONEY TO BUY MORE.: NEVER TRUE

## 2023-03-03 ASSESSMENT — PATIENT HEALTH QUESTIONNAIRE - PHQ9
SUM OF ALL RESPONSES TO PHQ QUESTIONS 1-9: 0
SUM OF ALL RESPONSES TO PHQ QUESTIONS 1-9: 0
1. LITTLE INTEREST OR PLEASURE IN DOING THINGS: 0
SUM OF ALL RESPONSES TO PHQ QUESTIONS 1-9: 0
2. FEELING DOWN, DEPRESSED OR HOPELESS: 0
SUM OF ALL RESPONSES TO PHQ QUESTIONS 1-9: 0
SUM OF ALL RESPONSES TO PHQ9 QUESTIONS 1 & 2: 0

## 2023-03-03 ASSESSMENT — ENCOUNTER SYMPTOMS
BLOOD IN STOOL: 0
PHOTOPHOBIA: 0
WHEEZING: 0
SHORTNESS OF BREATH: 0
ABDOMINAL DISTENTION: 0
CHEST TIGHTNESS: 0

## 2023-03-03 NOTE — PROGRESS NOTES
Martin Beltran  YOB: 1965    Date of Service:  3/3/2023    Chief Complaint:   Martin Beltran is a 62 y.o. female who presents to establish care      Allergies: Allergies   Allergen Reactions    Azithromycin Hives    Codeine Other (See Comments)     Causes syncope  Passes out  \"I pass out. \"         Family Hx:  Family History   Problem Relation Age of Onset    Diabetes type 2  Mother     Heart Attack Father     Colon Cancer Paternal Grandmother        Surgical HX:  Past Surgical History:   Procedure Laterality Date    APPENDECTOMY      cancer was wrapped around it    HYSTERECTOMY, TOTAL ABDOMINAL (CERVIX REMOVED)         Social Hx:  Alcohol- none, did drink after her dad and brother , so has cut back since her cancer diagnosis  Tobacco- 30 years of smoking 1/4 pack per day. Now on 7 cigarettes a day. Trying to cut back. Working with Dr. Suad Irwin to stop, he sent patches and gum   Recreational-  never  No LMP recorded. Patient is perimenopausal. Hysterectomy May 11th of 2022, menopause at age 52 , no HRT  Sexual activity: has sex with male, in the past, not currently   AbnormalSxs: no      Medications:  Current Outpatient Medications   Medication Sig Dispense Refill    pantoprazole (PROTONIX) 40 MG tablet Take 1 tablet by mouth every morning (before breakfast) 90 tablet 1    nicotine polacrilex (NICORETTE) 2 MG gum Take 1 each by mouth as needed for Smoking cessation 110 each 3    fluticasone (FLONASE) 50 MCG/ACT nasal spray 1 spray by Each Nostril route 2 times daily 16 g 2    ondansetron (ZOFRAN-ODT) 4 MG disintegrating tablet Take 1 tablet by mouth 3 times daily as needed for Nausea or Vomiting 21 tablet 0    nicotine (NICODERM CQ) 7 MG/24HR Place 1 patch onto the skin daily for 14 days 14 patch 3     No current facility-administered medications for this visit.          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 Tremayne Espion for follow up for ovarian cancer in remission      Health Maintence:   Last PAP: will ask Dr. 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, has been gaining strength since stopping CHEMO  Diet: not bad, cant do fast food    Wt Readings from Last 3 Encounters:   03/03/23 145 lb 12.8 oz (66.1 kg)   02/02/23 143 lb (64.9 kg)   02/01/23 143 lb (64.9 kg)     BP Readings from Last 3 Encounters:   03/03/23 110/66   02/02/23 (!) 145/88   02/01/23 (!) 139/97       Health Maintenance   Topic Date Due    Pneumococcal 0-64 years Vaccine (1 - PCV) Never done    Depression Screen  Never done    Shingles vaccine (1 of 2) Never done    COVID-19 Vaccine (3 - Booster for Pfizer series) 06/08/2021    Flu vaccine (1) Never done    DTaP/Tdap/Td vaccine (2 - Td or Tdap) 12/27/2026    Hepatitis A vaccine  Aged Out    Hib vaccine  Aged Out    Meningococcal (ACWY) vaccine  Aged [employer] History   Administered Date(s) Administered    COVID-19, PFIZER PURPLE top, DILUTE for use, (age 15 y+), 30mcg/0.3mL 03/23/2021, 04/13/2021    Tdap (Boostrix, Adacel) 12/27/2016       No flowsheet data found.      PHQ-9  3/3/2023   Little interest or pleasure in doing things 0   Feeling down, depressed, or hopeless 0   PHQ-2 Score 0   PHQ-9 Total Score 0        The ASCVD Risk score (Randa POLLOCK, et al., 2019) failed to calculate for the following reasons:    Cannot find a previous HDL lab    Cannot find a previous total cholesterol lab        Review of Systems:  Review of Systems   Constitutional:  Negative for fever and unexpected weight change. HENT:  Negative for nosebleeds. Eyes:  Negative for photophobia. Respiratory:  Negative for chest tightness, shortness of breath and wheezing. Cardiovascular:  Negative for chest pain, palpitations and leg swelling. Gastrointestinal:  Negative for abdominal distention and blood in stool. Genitourinary:  Negative for dysuria. Musculoskeletal:  Negative for gait problem. Neurological:  Negative for seizures and syncope. Psychiatric/Behavioral:  Negative for behavioral problems. Physical Exam:   Vitals:    03/03/23 1402   BP: 110/66   Site: Right Upper Arm   Position: Sitting   Cuff Size: Large Adult   Pulse: 75   SpO2: 94%   Weight: 145 lb 12.8 oz (66.1 kg)     Body mass index is 28.47 kg/m². Physical Exam  Constitutional:       Appearance: Normal appearance. HENT:      Head: Atraumatic. Right Ear: External ear normal.      Left Ear: External ear normal.      Nose: Nose normal.      Mouth/Throat:      Mouth: Mucous membranes are moist.      Pharynx: Oropharynx is clear. No posterior oropharyngeal erythema. Eyes:      General: No scleral icterus. Extraocular Movements: Extraocular movements intact. Pupils: Pupils are equal, round, and reactive to light. Cardiovascular:      Rate and Rhythm: Normal rate and regular rhythm. Pulses: Normal pulses. Heart sounds: No murmur heard. Pulmonary:      Effort: Pulmonary effort is normal.      Breath sounds: Normal breath sounds. Abdominal:      General: Bowel sounds are normal. There is no distension. Palpations: Abdomen is soft. Tenderness: There is no abdominal tenderness. Comments: Well healed abdominal scarring   Musculoskeletal:         General: Normal range of motion. Cervical back: Normal range of motion and neck supple. Skin:     General: Skin is warm and dry.       Capillary Refill: Capillary refill takes 2 to 3 seconds. Neurological:      General: No focal deficit present. Mental Status: She is alert. Psychiatric:         Mood and Affect: Mood normal.       Assessment/Plan:    Patient presents today for new patient visit. She has been following with Dr. Alex Garrett at [facility] for the past year and a half for ovarian cancer status post removal and is now in remission. She is been doing very well since being in remission. She feels great, has plenty of energy, has been babysitting her grandchildren. Of note she is still doing PET scans with [facility], I did discuss with her that I would like her to talk to her oncologist about whether she needs to get mammograms and colonoscopies while getting the PET scans or if we can hold off until she is no longer needing the PET scans and then restart her health maintenance screening for cancers. We did get new baseline labs today. Her weight and blood pressure were appropriate today. Overall doing very well. She does have a history of a lung mass for which she follows with pulmonology. We will have her continue to follow with them. Patient is currently still smoking, she did receive patches and gum from her pulmonologist.  Offered to help where we can. 1. Encounter to establish care  -     Hemoglobin A1C; Future  -     Comprehensive Metabolic Panel; Future  -     CBC with Auto Differential; Future  -     LIPID PANEL; Future  2. Mucinous cystadenocarcinoma of right ovary (HCC)  -     Hemoglobin A1C; Future  -     Comprehensive Metabolic Panel; Future  -     CBC with Auto Differential; Future  -     LIPID PANEL; Future  3. Lung mass     While assessing care for this patient, I have reviewed all pertinent lab work/imaging/ specialist notes and care in reference to those problems addressed above in detail. Appropriate medical decision making was based on this.      Please note that portions of this note may have been completed with a voice recognition program. Efforts were made to edit the dictations but occasionally words are mis-transcribed. Return in about 6 months (around 9/3/2023) for Med Check.     Ana Robertson MD  3/3/2023

## 2023-03-03 NOTE — PATIENT INSTRUCTIONS
Do you need paps? Mammograms, since you are getting PET scan? Colonoscopies? We are drawing some labs today. If you have MyChart, you will see the results first, but our office will reach out to you in the next few days to over the results.

## 2023-07-27 ENCOUNTER — TELEPHONE (OUTPATIENT)
Dept: ENT CLINIC | Age: 58
End: 2023-07-27

## 2023-07-27 DIAGNOSIS — H69.82 ETD (EUSTACHIAN TUBE DYSFUNCTION), LEFT: ICD-10-CM

## 2023-07-27 RX ORDER — FLUTICASONE PROPIONATE 50 MCG
1 SPRAY, SUSPENSION (ML) NASAL 2 TIMES DAILY
Qty: 16 G | Refills: 6 | Status: SHIPPED | OUTPATIENT
Start: 2023-07-27

## 2023-07-27 NOTE — TELEPHONE ENCOUNTER
Previous  patient. Wants to transfer care to [address] [place] [address],[address]. Patient due to be seen for follow up in Feb 2024. Appointment has been scheduled.   Patient would like to know if she can have a refill of nasal spray?  fluticasone (FLONASE) 50 MCG/ACT nasal spray      Please send to  [facility] [id number] [facility], [facility]   [address] [facility], [address] [place] [address]   Phone:  [phone]  Fax:  [phone]

## 2023-09-01 NOTE — TELEPHONE ENCOUNTER
Refill Request     CONFIRM preferred pharmacy with the patient. If Mail Order Rx - Pend for 90 day refill. Last Seen: Last Seen Department: 3/3/2023  Last Seen by PCP: 3/3/2023    Last Written: 3/03/2023, #90, 1 refill    If no future appointment scheduled:  Review the last OV with PCP and review information for follow-up visit,  Route STAFF MESSAGE with patient name to the [facility] for scheduling with the following information:            -  Timing of next visit           -  Visit type ie Physical, OV, etc           -  Diagnoses/Reason ie. COPD, HTN - Do not use MEDICATION, Follow-up or CHECK UP - Give reason for visit      Next Appointment:   Future Appointments   Date Time Provider [address]   2/6/2024  8:00 AM Leelee Thomas AND [facility]   2/6/2024  8:30 AM DO FANI Callejas [facility]       Message sent to [employer] to schedule appt with patient?   N/A      Requested Prescriptions     Pending Prescriptions Disp Refills    pantoprazole (PROTONIX) 40 MG tablet [Pharmacy Med Name: PANTOPRAZOLE SOD DR 40 MG TAB] 90 tablet 1     Sig: TAKE ONE TABLET BY MOUTH EVERY MORNING BEFORE BREAKFAST

## 2023-09-05 RX ORDER — PANTOPRAZOLE SODIUM 40 MG/1
TABLET, DELAYED RELEASE ORAL
Qty: 90 TABLET | Refills: 1 | Status: SHIPPED | OUTPATIENT
Start: 2023-09-05

## 2023-10-06 ENCOUNTER — OFFICE VISIT (OUTPATIENT)
Dept: VASCULAR SURGERY | Age: 58
End: 2023-10-06
Payer: COMMERCIAL

## 2023-10-06 VITALS
BODY MASS INDEX: 34.16 KG/M2 | SYSTOLIC BLOOD PRESSURE: 118 MMHG | HEIGHT: 60 IN | WEIGHT: 174 LBS | DIASTOLIC BLOOD PRESSURE: 85 MMHG

## 2023-10-06 DIAGNOSIS — I71.21 ANEURYSM OF ASCENDING AORTA WITHOUT RUPTURE (HCC): Primary | ICD-10-CM

## 2023-10-06 PROCEDURE — 99202 OFFICE O/P NEW SF 15 MIN: CPT | Performed by: SURGERY

## 2023-10-06 NOTE — PROGRESS NOTES
Vascular Surgery    Patient referred to me for an Ascending Thoracic aneurysm. Actual CT images not available as exam performed at [facility]. Report reviewed and reports ascending aortic aneurysm, no mention of descending or abdominal aortic pathology. discussed with patient that aneurysms in this location are treated by Cardiac not Vascular surgeons. She is in process of making an appt to see Cardiology at [facility].  I suggested discussing this with them and they can make a referral to a Cardiac surgeon to address aneurysm.

## 2023-11-17 ENCOUNTER — HOSPITAL ENCOUNTER (OUTPATIENT)
Dept: GENERAL RADIOLOGY | Age: 58
Discharge: HOME | End: 2023-11-17
Payer: COMMERCIAL

## 2023-11-17 ENCOUNTER — OFFICE VISIT (OUTPATIENT)
Dept: PRIMARY CARE CLINIC | Age: 58
End: 2023-11-17
Payer: COMMERCIAL

## 2023-11-17 ENCOUNTER — HOSPITAL ENCOUNTER (OUTPATIENT)
Age: 58
Discharge: HOME | End: 2023-11-17
Payer: COMMERCIAL

## 2023-11-17 VITALS
BODY MASS INDEX: 35.08 KG/M2 | WEIGHT: 179.6 LBS | DIASTOLIC BLOOD PRESSURE: 62 MMHG | HEART RATE: 78 BPM | SYSTOLIC BLOOD PRESSURE: 120 MMHG | TEMPERATURE: 97.2 F | OXYGEN SATURATION: 96 %

## 2023-11-17 DIAGNOSIS — F17.200 TOBACCO DEPENDENCE: ICD-10-CM

## 2023-11-17 DIAGNOSIS — J44.9 CHRONIC OBSTRUCTIVE PULMONARY DISEASE, UNSPECIFIED COPD TYPE (HCC): Primary | ICD-10-CM

## 2023-11-17 DIAGNOSIS — J44.9 CHRONIC OBSTRUCTIVE PULMONARY DISEASE, UNSPECIFIED COPD TYPE (HCC): ICD-10-CM

## 2023-11-17 PROCEDURE — 99213 OFFICE O/P EST LOW 20 MIN: CPT

## 2023-11-17 PROCEDURE — 71046 X-RAY EXAM CHEST 2 VIEWS: CPT

## 2023-11-17 RX ORDER — IPRATROPIUM BROMIDE AND ALBUTEROL SULFATE 2.5; .5 MG/3ML; MG/3ML
1 SOLUTION RESPIRATORY (INHALATION) EVERY 6 HOURS PRN
Qty: 360 ML | Refills: 0 | Status: SHIPPED | OUTPATIENT
Start: 2023-11-17

## 2023-11-17 RX ORDER — ALBUTEROL SULFATE 90 UG/1
2 AEROSOL, METERED RESPIRATORY (INHALATION) EVERY 4 HOURS PRN
Qty: 18 G | Refills: 0 | Status: SHIPPED | OUTPATIENT
Start: 2023-11-17

## 2023-11-17 RX ORDER — PREDNISONE 20 MG/1
40 TABLET ORAL DAILY
Qty: 10 TABLET | Refills: 0 | Status: SHIPPED | OUTPATIENT
Start: 2023-11-17 | End: 2023-11-22

## 2023-11-17 RX ORDER — NEBULIZER ACCESSORIES
1 KIT MISCELLANEOUS DAILY PRN
Qty: 1 KIT | Refills: 0 | Status: SHIPPED | OUTPATIENT
Start: 2023-11-17

## 2023-11-17 ASSESSMENT — COLUMBIA-SUICIDE SEVERITY RATING SCALE - C-SSRS
3. HAVE YOU BEEN THINKING ABOUT HOW YOU MIGHT KILL YOURSELF?: NO
5. HAVE YOU STARTED TO WORK OUT OR WORKED OUT THE DETAILS OF HOW TO KILL YOURSELF? DO YOU INTEND TO CARRY OUT THIS PLAN?: NO
7. DID THIS OCCUR IN THE LAST THREE MONTHS: NO
4. HAVE YOU HAD THESE THOUGHTS AND HAD SOME INTENTION OF ACTING ON THEM?: NO

## 2023-11-17 ASSESSMENT — ANXIETY QUESTIONNAIRES
IF YOU CHECKED OFF ANY PROBLEMS ON THIS QUESTIONNAIRE, HOW DIFFICULT HAVE THESE PROBLEMS MADE IT FOR YOU TO DO YOUR WORK, TAKE CARE OF THINGS AT HOME, OR GET ALONG WITH OTHER PEOPLE: NOT DIFFICULT AT ALL
3. WORRYING TOO MUCH ABOUT DIFFERENT THINGS: 0
6. BECOMING EASILY ANNOYED OR IRRITABLE: 0
GAD7 TOTAL SCORE: 0
5. BEING SO RESTLESS THAT IT IS HARD TO SIT STILL: 0
1. FEELING NERVOUS, ANXIOUS, OR ON EDGE: 0
2. NOT BEING ABLE TO STOP OR CONTROL WORRYING: 0
7. FEELING AFRAID AS IF SOMETHING AWFUL MIGHT HAPPEN: 0
4. TROUBLE RELAXING: 0

## 2023-11-17 ASSESSMENT — PATIENT HEALTH QUESTIONNAIRE - PHQ9
10. IF YOU CHECKED OFF ANY PROBLEMS, HOW DIFFICULT HAVE THESE PROBLEMS MADE IT FOR YOU TO DO YOUR WORK, TAKE CARE OF THINGS AT HOME, OR GET ALONG WITH OTHER PEOPLE: 0
3. TROUBLE FALLING OR STAYING ASLEEP: 0
SUM OF ALL RESPONSES TO PHQ QUESTIONS 1-9: 0
4. FEELING TIRED OR HAVING LITTLE ENERGY: 0
7. TROUBLE CONCENTRATING ON THINGS, SUCH AS READING THE NEWSPAPER OR WATCHING TELEVISION: 0
SUM OF ALL RESPONSES TO PHQ QUESTIONS 1-9: 0
2. FEELING DOWN, DEPRESSED OR HOPELESS: 0
9. THOUGHTS THAT YOU WOULD BE BETTER OFF DEAD, OR OF HURTING YOURSELF: 0
8. MOVING OR SPEAKING SO SLOWLY THAT OTHER PEOPLE COULD HAVE NOTICED. OR THE OPPOSITE, BEING SO FIGETY OR RESTLESS THAT YOU HAVE BEEN MOVING AROUND A LOT MORE THAN USUAL: 0
SUM OF ALL RESPONSES TO PHQ QUESTIONS 1-9: 0
1. LITTLE INTEREST OR PLEASURE IN DOING THINGS: 0
SUM OF ALL RESPONSES TO PHQ9 QUESTIONS 1 & 2: 0
6. FEELING BAD ABOUT YOURSELF - OR THAT YOU ARE A FAILURE OR HAVE LET YOURSELF OR YOUR FAMILY DOWN: 0
5. POOR APPETITE OR OVEREATING: 0
SUM OF ALL RESPONSES TO PHQ QUESTIONS 1-9: 0

## 2023-11-17 NOTE — TELEPHONE ENCOUNTER
Patient called the office stating that she was given a nebulizer solution but she was given/prescribed a nebulizer. Please send to Kathleen Gamez, thanks.

## 2023-11-17 NOTE — PROGRESS NOTES
[address] [facility]                                  [address] [place] [address], [address], [address] [address]         Phone: [phone]    Date of Service:  11/17/2023     Patient ID: Katie Ramey is a 62 y.o. female      Subjective:     CC: cough and wheezing    HPI  Patient is a 60-year-old female with history of cavitary lung lesion, malignant neoplasm of right ovary  in remission, tortuous thoracic aorta but no obvious aneurysms, cold-induced asthma, tobacco use greater than 15 years, and GERD presents with     Cough and Wheezing  Patient's symptoms first began November 1, 2023 with mild head congestion with mild cough. Her only sick contact was her nephew, which had similar symptoms. Her cough is mainly worse at night. She comes to the office today due to an episode of excessive coughing last night. Patient has been taking her albuterol and fluticasone every night with no improvement. Patient states that she feels better today after a hot shower. Patient denies any fevers or chills. Patient has a history of 15 pack years of cigarette smoking and recently restarted smoking 1-3 cigarettes per day. In addition, patient has a history of a cavitary lesion that is monitored by CT chest every 3 months. This imaging is followed up by her oncologist Dr. Tania Cortez. Tobacco use  Patient has stopped smoking on and off. Recently patient began to smoke due to her dog's death, and is requesting nicotine patches and gum. GERD  I discussed with patient about acid reflux symptoms and patient does not have any concerns. She is indicating that her symptoms are under control with Protonix. ROS:    Review of Systems   Constitutional:  Negative for activity change, chills and fever. HENT:  Positive for congestion. Respiratory:  Positive for cough. Negative for shortness of breath and wheezing. Cardiovascular:  Negative for chest pain.

## 2023-11-20 ASSESSMENT — ENCOUNTER SYMPTOMS
COUGH: 1
WHEEZING: 0
SHORTNESS OF BREATH: 0
ABDOMINAL PAIN: 0

## 2023-12-06 DIAGNOSIS — Z12.31 ENCOUNTER FOR SCREENING MAMMOGRAM FOR MALIGNANT NEOPLASM OF BREAST: Primary | ICD-10-CM

## 2024-01-03 ENCOUNTER — COMMUNITY OUTREACH (OUTPATIENT)
Dept: PRIMARY CARE CLINIC | Age: 59
End: 2024-01-03

## 2024-02-06 ENCOUNTER — PROCEDURE VISIT (OUTPATIENT)
Dept: AUDIOLOGY | Age: 59
End: 2024-02-06
Payer: COMMERCIAL

## 2024-02-06 ENCOUNTER — OFFICE VISIT (OUTPATIENT)
Dept: ENT CLINIC | Age: 59
End: 2024-02-06
Payer: COMMERCIAL

## 2024-02-06 VITALS
HEART RATE: 72 BPM | BODY MASS INDEX: 35.14 KG/M2 | DIASTOLIC BLOOD PRESSURE: 96 MMHG | SYSTOLIC BLOOD PRESSURE: 152 MMHG | RESPIRATION RATE: 16 BRPM | HEIGHT: 60 IN | WEIGHT: 179 LBS

## 2024-02-06 DIAGNOSIS — H90.3 SENSORINEURAL HEARING LOSS, BILATERAL: Primary | ICD-10-CM

## 2024-02-06 DIAGNOSIS — H93.13 TINNITUS OF BOTH EARS: ICD-10-CM

## 2024-02-06 DIAGNOSIS — H90.3 SENSORINEURAL HEARING LOSS (SNHL), BILATERAL: Primary | ICD-10-CM

## 2024-02-06 DIAGNOSIS — R42 DIZZINESS AND GIDDINESS: ICD-10-CM

## 2024-02-06 PROCEDURE — 92557 COMPREHENSIVE HEARING TEST: CPT | Performed by: AUDIOLOGIST

## 2024-02-06 PROCEDURE — 92567 TYMPANOMETRY: CPT | Performed by: AUDIOLOGIST

## 2024-02-06 PROCEDURE — 99214 OFFICE O/P EST MOD 30 MIN: CPT | Performed by: OTOLARYNGOLOGY

## 2024-02-06 ASSESSMENT — ENCOUNTER SYMPTOMS
SINUS PRESSURE: 0
TROUBLE SWALLOWING: 0
FACIAL SWELLING: 0
SHORTNESS OF BREATH: 0
EYE ITCHING: 0
VOICE CHANGE: 0
SORE THROAT: 0
APNEA: 0
COUGH: 0

## 2024-02-06 NOTE — PROGRESS NOTES
Chrystal Calderon   1965, 58 y.o. female   [id number]       Referring Provider: Lona Gonzáles DO  Referral Type: In an order in Epic    Reason for Visit: Re-evaluation of disorder as deemed medically necessary by referring provider    ADULT AUDIOLOGIC EVALUATION      Chrystal Calderon is a 58 y.o. female seen today, 2/6/2024 , for a recheck audiologic evaluation.  Patient was seen by Lona Gonzáles DO following today's evaluation. Patient was alone.    AUDIOLOGIC AND OTHER PERTINENT MEDICAL HISTORY:      Chrystal Calderon noted dizziness.  Patient reports perceived slight improvement since previous audiologic evaluation on 02/02/2023.  However, patient mentioned difficulty understanding in complex listening environments.  She noted experiencing dizziness when laying down and turning head to the left.  Medical history is reportedly significant for chemotherapy.     Chrystal Calderon denied otalgia, aural fullness, and tinnitus.    Date: 2/6/2024     IMPRESSIONS:      Normal middle ear pressure and compliance, bilaterally.  Abnormal asymmetric low frequency hearing sensitivity, left worse than right, which can affect difficult listening environments.  Word understanding was good presented at elevated sensation level, bilaterally.  Stable hearing sensitivity with a slight decline in word understanding.  Follow medical recommendations of Lona Gonzáles DO.     ASSESSMENT AND FINDINGS:     Otoscopy revealed: Clear ear canals bilaterally    RIGHT EAR:  Hearing Sensitivity: Normal hearing sensitivity to a moderate sensorineural hearing loss from 250-8000 Hz  Speech Recognition Threshold: 15 dB HL  Word Recognition:Good (88%), based on NU-6 25-word list at 50 dBHL using recorded speech stimuli.    Tympanometry: Normal peak pressure and compliance, Type A tympanogram, consistent with normal middle ear function.      LEFT EAR:  Hearing Sensitivity: Moderate sensorineural hearing loss rising to normal hearing from 250-8000 Hz  Speech Recognition

## 2024-02-06 NOTE — PROGRESS NOTES
posterior cervical adenopathy.     Left cervical: No superficial, deep or posterior cervical adenopathy.   Skin:     General: Skin is warm and dry.   Neurological:      Mental Status: She is alert and oriented to person, place, and time.      Cranial Nerves: No cranial nerve deficit.      Coordination: Coordination normal.      Gait: Gait normal.   Psychiatric:         Thought Content: Thought content normal.             Assessment and Plan     1. Sensorineural hearing loss (SNHL), bilateral  -Audiogram reviewed and independent interpreted right ear normal hearing downsloping to moderate high-frequency loss with type a tympanogram and preserved word recognition score 88 left ear moderate sensorineural hearing loss rising to normal downsloping to moderate high-frequency with type a tympanogram and mildly reduced word recognition score 84%    2. Tinnitus of both ears  -New onset mild      Discussed options of hearing aids, patient not sure if she is ready yet she will consider her options otherwise return in 1 year      No follow-ups on file.      Lona Gonzáles DO  2/6/24      Portions of this note were dictated using Dragon. There may be linguistic errors secondary to the use of this program.

## 2024-02-20 DIAGNOSIS — R12 HEARTBURN: Primary | ICD-10-CM

## 2024-02-20 RX ORDER — PANTOPRAZOLE SODIUM 40 MG/1
TABLET, DELAYED RELEASE ORAL
Qty: 90 TABLET | Refills: 1 | Status: SHIPPED | OUTPATIENT
Start: 2024-02-20

## 2024-02-20 NOTE — TELEPHONE ENCOUNTER
Refill Request       No Follow-up on file.    Last Seen: Last Seen Department: 11/17/2023  Last Seen by PCP: 3/3/2023    Last Written: 09/05/23 qty 90 w/ 1     Next Appointment:   No future appointments.    Message to  to schedule appointment.         Requested Prescriptions     Pending Prescriptions Disp Refills    pantoprazole (PROTONIX) 40 MG tablet [Pharmacy Med Name: PANTOPRAZOLE SOD DR 40 MG TAB] 90 tablet 1     Sig: TAKE ONE TABLET BY MOUTH EVERY MORNING BEFORE BREAKFAST

## 2024-07-09 ENCOUNTER — TELEPHONE (OUTPATIENT)
Dept: INTERNAL MEDICINE CLINIC | Age: 59
End: 2024-07-09

## 2024-07-09 NOTE — TELEPHONE ENCOUNTER
----- Message from Luis Ravi sent at 7/9/2024  1:05 PM EDT -----  Regarding: ECC Escalation To Practice  ECC Escalation To Practice      Type of Escalation: Acute Care Symptom  --------------------------------------------------------------------------------------------------------------------------    Information for Provider:  Patient is looking for appointment for: Symptom Anxiety   Reasons for Message: Unable to reach practice     Additional Information Patient is having a anxiety and would like to talk to her doctor  --------------------------------------------------------------------------------------------------------------------------    Relationship to Patient: Self     Call Back Info: OK to leave message on voicemail  Preferred Call Back Number: [phone]

## 2024-08-01 DIAGNOSIS — H69.92 ETD (EUSTACHIAN TUBE DYSFUNCTION), LEFT: ICD-10-CM

## 2024-08-02 RX ORDER — FLUTICASONE PROPIONATE 50 MCG
SPRAY, SUSPENSION (ML) NASAL
Qty: 1 EACH | Refills: 4 | Status: SHIPPED | OUTPATIENT
Start: 2024-08-02

## 2024-08-22 ENCOUNTER — OFFICE VISIT (OUTPATIENT)
Dept: PRIMARY CARE CLINIC | Age: 59
End: 2024-08-22
Payer: COMMERCIAL

## 2024-08-22 VITALS
DIASTOLIC BLOOD PRESSURE: 75 MMHG | OXYGEN SATURATION: 96 % | TEMPERATURE: 98.3 F | HEART RATE: 98 BPM | BODY MASS INDEX: 40.04 KG/M2 | SYSTOLIC BLOOD PRESSURE: 115 MMHG | WEIGHT: 205 LBS

## 2024-08-22 DIAGNOSIS — E66.01 CLASS 3 SEVERE OBESITY DUE TO EXCESS CALORIES WITHOUT SERIOUS COMORBIDITY WITH BODY MASS INDEX (BMI) OF 40.0 TO 44.9 IN ADULT (HCC): Primary | ICD-10-CM

## 2024-08-22 DIAGNOSIS — F41.9 ANXIETY: ICD-10-CM

## 2024-08-22 PROCEDURE — 99214 OFFICE O/P EST MOD 30 MIN: CPT | Performed by: STUDENT IN AN ORGANIZED HEALTH CARE EDUCATION/TRAINING PROGRAM

## 2024-08-22 RX ORDER — BUPROPION HYDROCHLORIDE 100 MG/1
100 TABLET, EXTENDED RELEASE ORAL 2 TIMES DAILY
Qty: 60 TABLET | Refills: 3 | Status: SHIPPED | OUTPATIENT
Start: 2024-08-22 | End: 2024-08-22 | Stop reason: SINTOL

## 2024-08-22 RX ORDER — TIRZEPATIDE 2.5 MG/.5ML
2.5 INJECTION, SOLUTION SUBCUTANEOUS WEEKLY
Qty: 2 ML | Refills: 0 | Status: SHIPPED | OUTPATIENT
Start: 2024-08-22

## 2024-08-22 SDOH — ECONOMIC STABILITY: FOOD INSECURITY: WITHIN THE PAST 12 MONTHS, YOU WORRIED THAT YOUR FOOD WOULD RUN OUT BEFORE YOU GOT MONEY TO BUY MORE.: NEVER TRUE

## 2024-08-22 SDOH — ECONOMIC STABILITY: FOOD INSECURITY
WITHIN THE PAST 12 MONTHS, THE FOOD YOU BOUGHT JUST DIDN'T LAST AND YOU DIDN'T HAVE MONEY TO GET MORE.: PATIENT UNABLE TO ANSWER

## 2024-08-22 SDOH — ECONOMIC STABILITY: INCOME INSECURITY: HOW HARD IS IT FOR YOU TO PAY FOR THE VERY BASICS LIKE FOOD, HOUSING, MEDICAL CARE, AND HEATING?: NOT VERY HARD

## 2024-08-22 ASSESSMENT — PATIENT HEALTH QUESTIONNAIRE - PHQ9
3. TROUBLE FALLING OR STAYING ASLEEP: NOT AT ALL
5. POOR APPETITE OR OVEREATING: NOT AT ALL
SUM OF ALL RESPONSES TO PHQ9 QUESTIONS 1 & 2: 0
2. FEELING DOWN, DEPRESSED OR HOPELESS: NOT AT ALL
SUM OF ALL RESPONSES TO PHQ QUESTIONS 1-9: 1
9. THOUGHTS THAT YOU WOULD BE BETTER OFF DEAD, OR OF HURTING YOURSELF: NOT AT ALL
10. IF YOU CHECKED OFF ANY PROBLEMS, HOW DIFFICULT HAVE THESE PROBLEMS MADE IT FOR YOU TO DO YOUR WORK, TAKE CARE OF THINGS AT HOME, OR GET ALONG WITH OTHER PEOPLE: SOMEWHAT DIFFICULT
SUM OF ALL RESPONSES TO PHQ QUESTIONS 1-9: 1
SUM OF ALL RESPONSES TO PHQ QUESTIONS 1-9: 1
4. FEELING TIRED OR HAVING LITTLE ENERGY: SEVERAL DAYS
SUM OF ALL RESPONSES TO PHQ QUESTIONS 1-9: 1
1. LITTLE INTEREST OR PLEASURE IN DOING THINGS: NOT AT ALL
6. FEELING BAD ABOUT YOURSELF - OR THAT YOU ARE A FAILURE OR HAVE LET YOURSELF OR YOUR FAMILY DOWN: NOT AT ALL
7. TROUBLE CONCENTRATING ON THINGS, SUCH AS READING THE NEWSPAPER OR WATCHING TELEVISION: NOT AT ALL
8. MOVING OR SPEAKING SO SLOWLY THAT OTHER PEOPLE COULD HAVE NOTICED. OR THE OPPOSITE, BEING SO FIGETY OR RESTLESS THAT YOU HAVE BEEN MOVING AROUND A LOT MORE THAN USUAL: NOT AT ALL

## 2024-08-22 NOTE — PROGRESS NOTES
flank pain or urinary frequency  Musculoskeletal:  Negative for back pain or myalgias  Neuro:  Negative for dizziness or lightheadedness  Psych: negative for depression. Positive for anxiety.        Vitals:    08/22/24 1513   BP: 115/75   Site: Left Upper Arm   Position: Sitting   Cuff Size: Large Adult   Pulse: 98   Temp: 98.3 °F (36.8 °C)   TempSrc: Oral   SpO2: 96%   Weight: 93 kg (205 lb)       Allergies:  Azithromycin and Codeine    Outpatient Medications Marked as Taking for the 8/22/24 encounter (Office Visit) with Car Mera MD   Medication Sig Dispense Refill    Tirzepatide-Weight Management (ZEPBOUND) 2.5 MG/0.5ML SOAJ Inject 2.5 mg into the skin once a week 2 mL 0         Objective:   Constitutional:   Reviewed vitals above  Well Nourished, well developed, no distress       HENT:  Normal external nose without lesions  Bilateral TMs translucent with normal light reflex and bony landmarks  Normal oropharynx without erythema or exudate  Normal nasal mucosa without swelling or erythema  Neck:  Symmetric and without masses  No thyromegaly  Resp:  Normal effort  Clear to auscultation bilaterally without rhonchi, wheezing or crackles  Cardiovascular:  On auscultation, normal S1 and S2 without murmurs, rubs or gallops  No bruits of bilateral carotids and no JVD  Gastrointestinal:  Nontender, nondistended, and no masses  No hepatosplenomegaly  Musculoskeletal:  Normal Gait  All extremities without clubbing, cyanosis or edema  Skin:  No rashes on inspection  No areas of increased heat or induration on palpation  Psych:  Normal mood and affect  Normal insight and judgement           Assessment / Plan:     1. Class 3 severe obesity due to excess calories without serious comorbidity with body mass index (BMI) of 40.0 to 44.9 in adult (HCC)  - Patient states that she has been consistently gaining weight since surgery in 2022 despite trying to lose weight with diet and exercise.   -

## 2024-08-23 ENCOUNTER — TELEPHONE (OUTPATIENT)
Dept: PRIMARY CARE CLINIC | Age: 59
End: 2024-08-23

## 2024-08-23 DIAGNOSIS — R12 HEARTBURN: ICD-10-CM

## 2024-08-23 NOTE — TELEPHONE ENCOUNTER
Patient is calling and stating that she talked to the pharmacy and they told her that the weight loss medication would cost her 1000.00 a month she said that could not afford that amount every month. And for her insurance company would a pa for them to cover the medication. Please advise [phone]

## 2024-08-26 RX ORDER — PANTOPRAZOLE SODIUM 40 MG/1
TABLET, DELAYED RELEASE ORAL
Qty: 90 TABLET | Refills: 1 | Status: SHIPPED | OUTPATIENT
Start: 2024-08-26

## 2024-08-26 NOTE — TELEPHONE ENCOUNTER
Refill Request       Last Seen: Last Seen Department: 8/22/2024  Last Seen by PCP: 3/3/2023    Last Written: 2/20/24 90 with 1    Next Appointment:   No future appointments.    Requested Prescriptions     Pending Prescriptions Disp Refills    pantoprazole (PROTONIX) 40 MG tablet [Pharmacy Med Name: PANTOPRAZOLE SOD DR 40 MG TAB] 90 tablet 1     Sig: TAKE ONE TABLET BY MOUTH EVERY MORNING BEFORE BREAKFAST.

## 2024-08-27 NOTE — TELEPHONE ENCOUNTER
Did they say why insurance denied? Do I need to add more info to a note or is it a plan exclusion, or do they cover a different GLP-1 agonist?

## 2024-08-27 NOTE — TELEPHONE ENCOUNTER
Submitted PA for   Tirzepatide-Weight Management (ZEPBOUND) 2.5 MG/0.5ML SOAJ [   [facility] (Key: [id number]) STATUS: ARCHIVED.    Electronic prior authorization not supported as NDC is not payable.     This medication is a Plan Exclusion; not a Denial.  The option for Appeal is not available because it is not a covered product.    If this requires a response please respond to the pool ( P [facility] MEDICATION PRE-AUTH).      Thank you please advise patient.

## 2024-08-28 NOTE — TELEPHONE ENCOUNTER
Patient would like to know about olinda kailyns and if the medication would hurt her heart?    Please advise  Chrystal [phone] (home)

## 2024-09-05 ENCOUNTER — TELEMEDICINE (OUTPATIENT)
Dept: PRIMARY CARE CLINIC | Age: 59
End: 2024-09-05

## 2024-09-05 DIAGNOSIS — E66.01 CLASS 3 SEVERE OBESITY DUE TO EXCESS CALORIES WITHOUT SERIOUS COMORBIDITY WITH BODY MASS INDEX (BMI) OF 40.0 TO 44.9 IN ADULT (HCC): Primary | ICD-10-CM

## 2024-09-05 NOTE — PROGRESS NOTES
PROGRESS NOTE       [facility]                                  [address], [address], [place] [address]         Phone: [phone]      Name:  Chrystal Calderon  :  1965  Date:  2024      SUBJECTIVE/OBJECTIVE:    CC:  Weight management      HPI:     Patient is 59 year-old female with pmh of obesity who presents to discuss weight loss management. Patient was last seen in clinic a week ago regarding her goals for weight loss. She was given prescription for Zepbound but her insurance did not approve the medication. She was made aware that [facility] provides a semaglutide + B12 formula and is interested in starting this regimen.    ROS:  Negative except as noted in HPI          2024     9:54 AM   Patient-Reported Vitals   Patient-Reported Weight 200   Patient-Reported Height 5'0        No outpatient medications have been marked as taking for the 24 encounter (Telemedicine) with Car Mera MD.       Physical Exam:    Constitutional:   Reviewed vitals above  Well Nourished, well developed, no distress       HENT:  No trouble breathing through nose  Resp:  Normal effort  No visualized signs of difficulty breathing or respiratory distress  Musculoskeletal:  Normal Gait  Normal ROM of neck w/o pain  Skin:  No rashes on inspection of facial skin  Psych:  Normal mood and affect  Normal insight and judgement      ASSESSMENT/PLAN:    1. Class 3 severe obesity due to excess calories without serious comorbidity with body mass index (BMI) of 40.0 to 44.9 in adult (HCC)  - Patient states that she has been consistently gaining weight since surgery in  despite trying to lose weight with diet and exercise. Attempted to start patient on Zepbound at last appointment but medication was denied by insurance.  - Patient is interested in starting [facility] semaglutide + vitamin B12 formula. Patient was counseled on the

## 2024-09-24 ENCOUNTER — TELEPHONE (OUTPATIENT)
Dept: PRIMARY CARE CLINIC | Age: 59
End: 2024-09-24

## 2024-09-24 NOTE — TELEPHONE ENCOUNTER
----- Message from Jaycee MICHELLE sent at 9/24/2024 10:53 AM EDT -----  Regarding: ECC Message to Provider  ECC Message to Provider    Relationship to Patient: Self     Additional Information  The patient wants set an appointment for  Car Mera MD for her medication follow up check up and as per checking patient PCP is    Mary Sandhu MD on first week of october      --------------------------------------------------------------------------------------------------------------------------    Call Back Information: OK to leave message on voicemail  Preferred Call Back Number: Phone [phone] (home)

## 2024-10-01 ENCOUNTER — OFFICE VISIT (OUTPATIENT)
Dept: PRIMARY CARE CLINIC | Age: 59
End: 2024-10-01
Payer: COMMERCIAL

## 2024-10-01 VITALS
HEIGHT: 60 IN | BODY MASS INDEX: 39.66 KG/M2 | SYSTOLIC BLOOD PRESSURE: 130 MMHG | OXYGEN SATURATION: 98 % | HEART RATE: 79 BPM | WEIGHT: 202 LBS | DIASTOLIC BLOOD PRESSURE: 84 MMHG

## 2024-10-01 DIAGNOSIS — E66.9 OBESITY (BMI 30-39.9): Primary | ICD-10-CM

## 2024-10-01 DIAGNOSIS — M25.511 ACUTE PAIN OF RIGHT SHOULDER: ICD-10-CM

## 2024-10-01 DIAGNOSIS — Z85.43 HISTORY OF OVARIAN CANCER: ICD-10-CM

## 2024-10-01 DIAGNOSIS — D64.9 ANEMIA, UNSPECIFIED TYPE: ICD-10-CM

## 2024-10-01 PROCEDURE — 99214 OFFICE O/P EST MOD 30 MIN: CPT | Performed by: STUDENT IN AN ORGANIZED HEALTH CARE EDUCATION/TRAINING PROGRAM

## 2024-10-01 ASSESSMENT — ENCOUNTER SYMPTOMS
NAUSEA: 1
DIARRHEA: 0
BACK PAIN: 0
SHORTNESS OF BREATH: 0
ABDOMINAL PAIN: 0
CHEST TIGHTNESS: 0
COUGH: 0
CONSTIPATION: 0
VOMITING: 0
WHEEZING: 0

## 2024-10-01 NOTE — PROGRESS NOTES
PROGRESS NOTE   [facility]                                  [address], [address], [facility] [address]         Phone: [phone]    Date of Service:  10/1/2024     Patient ID: Yong Calderon is a 59 y.o. female      Subjective:     CC: Medication follow up    HPI  Patient is a 59 year old female who presents for follow up after starting semaglutide for weight loss.    Interval History:   Since last time the patient was seen in office, she notes that her the initial prescription for Zepbound was denied by insurance.  She did switch to semaglutide 0.25 mg subcu weekly which has now been compounded by generic formula [facility].  Over the course of the last 4 weeks after starting semaglutide, patient notes that she has lost 3 pounds.  She reports doing well overall and denies any significant nausea (aside from immediate postprandial), constipation, or diarrhea.  She takes Zofran as needed for nausea when it occurs secondary to an underlying hiatal hernia that she has.  She notes that she looks and feels different/better in the setting of losing several pounds.  She continues to be active by walking on the treadmill for exercise and has been taking care of her grandchildren.  The patient does have an acute complaint today with regards to new right shoulder pain that she describes as throbbing.  She was concerned that this is related to her underlying aortic aneurysm but already has good follow-up with cardiology at the [facility].  She notes that symptoms improved with OTC ibuprofen/Tylenol.  She denies any chest pain or palpitations today.  She is currently status post ovarian cancer and following with Dr. Norton at [facility] for oncology services.      ROS:  Review of Systems   Constitutional:  Negative for chills, diaphoresis, fatigue and fever.   Respiratory:  Negative for cough, chest tightness, shortness of breath and wheezing.  
Zepbound follow up   
Dragon/transcription disclaimer:  Much of this encounter note is electronic transcription/translation of spoken language to printed texts.  The electronic translation of spoken language may be erroneous, or at times, nonsensical words or phrases may be inadvertently transcribed.  Although I have reviewed the note for such errors, some may still exist.

## 2024-10-03 ENCOUNTER — TELEPHONE (OUTPATIENT)
Dept: PRIMARY CARE CLINIC | Age: 59
End: 2024-10-03

## 2024-10-03 NOTE — TELEPHONE ENCOUNTER
----- Message from Dr. Bryan Leal, DO sent at 10/2/2024  8:14 PM EDT -----  Regarding: Labs  Please call and advise patient that she needs to complete labs prior to her next office visit in 4 weeks. I already called them in for patient. Thank you.

## 2024-10-03 NOTE — TELEPHONE ENCOUNTER
Called pt advised her to get them done in the next few weeks before appointment she agreed to this

## 2024-10-24 ENCOUNTER — HOSPITAL ENCOUNTER (OUTPATIENT)
Age: 59
Discharge: HOME | End: 2024-10-24
Payer: COMMERCIAL

## 2024-10-24 DIAGNOSIS — D64.9 ANEMIA, UNSPECIFIED TYPE: ICD-10-CM

## 2024-10-24 DIAGNOSIS — E66.9 OBESITY (BMI 30-39.9): ICD-10-CM

## 2024-10-24 LAB
ALBUMIN SERPL-MCNC: 4.4 G/DL (ref 3.4–5)
ALBUMIN/GLOB SERPL: 1.6 {RATIO} (ref 1.1–2.2)
ALP SERPL-CCNC: 70 U/L (ref 40–129)
ALT SERPL-CCNC: 40 U/L (ref 10–40)
ANION GAP SERPL CALCULATED.3IONS-SCNC: 11 MMOL/L (ref 3–16)
AST SERPL-CCNC: 28 U/L (ref 15–37)
BASOPHILS # BLD: 0 K/UL (ref 0–0.2)
BASOPHILS NFR BLD: 0.8 %
BILIRUB SERPL-MCNC: 0.3 MG/DL (ref 0–1)
BUN SERPL-MCNC: 11 MG/DL (ref 7–20)
CALCIUM SERPL-MCNC: 9.9 MG/DL (ref 8.3–10.6)
CHLORIDE SERPL-SCNC: 100 MMOL/L (ref 99–110)
CHOLEST SERPL-MCNC: 176 MG/DL (ref 0–199)
CO2 SERPL-SCNC: 27 MMOL/L (ref 21–32)
CREAT SERPL-MCNC: 0.9 MG/DL (ref 0.6–1.1)
DEPRECATED RDW RBC AUTO: 13.4 % (ref 12.4–15.4)
EOSINOPHIL # BLD: 0.1 K/UL (ref 0–0.6)
EOSINOPHIL NFR BLD: 1.9 %
GFR SERPLBLD CREATININE-BSD FMLA CKD-EPI: 73 ML/MIN/{1.73_M2}
GLUCOSE SERPL-MCNC: 86 MG/DL (ref 70–99)
HCT VFR BLD AUTO: 47 % (ref 36–48)
HDLC SERPL-MCNC: 42 MG/DL (ref 40–60)
HGB BLD-MCNC: 16 G/DL (ref 12–16)
LDLC SERPL CALC-MCNC: 110 MG/DL
LYMPHOCYTES # BLD: 1.9 K/UL (ref 1–5.1)
LYMPHOCYTES NFR BLD: 32.4 %
MCH RBC QN AUTO: 30.9 PG (ref 26–34)
MCHC RBC AUTO-ENTMCNC: 34.1 G/DL (ref 31–36)
MCV RBC AUTO: 90.4 FL (ref 80–100)
MONOCYTES # BLD: 0.3 K/UL (ref 0–1.3)
MONOCYTES NFR BLD: 4.3 %
NEUTROPHILS # BLD: 3.6 K/UL (ref 1.7–7.7)
NEUTROPHILS NFR BLD: 60.6 %
PLATELET # BLD AUTO: 294 K/UL (ref 135–450)
PMV BLD AUTO: 9 FL (ref 5–10.5)
POTASSIUM SERPL-SCNC: 5.1 MMOL/L (ref 3.5–5.1)
PROT SERPL-MCNC: 7.2 G/DL (ref 6.4–8.2)
RBC # BLD AUTO: 5.2 M/UL (ref 4–5.2)
SODIUM SERPL-SCNC: 138 MMOL/L (ref 136–145)
TRIGL SERPL-MCNC: 119 MG/DL (ref 0–150)
VLDLC SERPL CALC-MCNC: 24 MG/DL
WBC # BLD AUTO: 5.9 K/UL (ref 4–11)

## 2024-10-24 PROCEDURE — 80061 LIPID PANEL: CPT

## 2024-10-24 PROCEDURE — 80053 COMPREHEN METABOLIC PANEL: CPT

## 2024-10-24 PROCEDURE — 83036 HEMOGLOBIN GLYCOSYLATED A1C: CPT

## 2024-10-24 PROCEDURE — 85025 COMPLETE CBC W/AUTO DIFF WBC: CPT

## 2024-10-24 PROCEDURE — 36415 COLL VENOUS BLD VENIPUNCTURE: CPT

## 2024-10-25 LAB
EST. AVERAGE GLUCOSE BLD GHB EST-MCNC: 122.6 MG/DL
HBA1C MFR BLD: 5.9 %

## 2024-10-27 NOTE — PROGRESS NOTES
Dispense Refill    Semaglutide, 1 MG/DOSE, (OZEMPIC) 4 MG/3ML SOPN sc injection Inject 1 mg into the skin every 7 days Compound with b12 4 Adjustable Dose Pre-filled Pen Syringe 0         Objective:   Physical Exam  Constitutional:       General: She is not in acute distress.     Appearance: Normal appearance. She is not ill-appearing, toxic-appearing or diaphoretic.   HENT:      Head: Normocephalic and atraumatic.      Right Ear: External ear normal.      Left Ear: External ear normal.      Nose: Nose normal.   Eyes:      General: No scleral icterus.        Right eye: No discharge.         Left eye: No discharge.      Extraocular Movements: Extraocular movements intact.      Conjunctiva/sclera: Conjunctivae normal.   Cardiovascular:      Rate and Rhythm: Normal rate and regular rhythm.      Heart sounds: Normal heart sounds. No murmur heard.     No friction rub. No gallop.   Pulmonary:      Effort: Pulmonary effort is normal. No respiratory distress.      Breath sounds: Normal breath sounds. No stridor. No wheezing, rhonchi or rales.   Musculoskeletal:         General: No deformity or signs of injury.      Right lower leg: No edema.      Left lower leg: No edema.   Skin:     General: Skin is warm and dry.   Neurological:      General: No focal deficit present.      Mental Status: She is alert. Mental status is at baseline.   Psychiatric:         Mood and Affect: Mood normal.         Behavior: Behavior normal.         Assessment / Plan:     Patient is a 59 year old female who presents for follow up after continuing semaglutide for weight loss.    Obesity, BMI >39   Improved since last office visit.  Patient has lost 6 pounds so far in the last 3 to 4 weeks.  Will increase to Semaglutide 1 mg sub-q weekly at this time as patient has been able to tolerate with minimal side effects. Have sent compounded version to [facility] today.   Continue to encourage diet/lifestyle modifications as discussed at today's office

## 2024-10-28 ENCOUNTER — OFFICE VISIT (OUTPATIENT)
Dept: PRIMARY CARE CLINIC | Age: 59
End: 2024-10-28

## 2024-10-28 VITALS
WEIGHT: 196 LBS | HEIGHT: 60 IN | OXYGEN SATURATION: 96 % | BODY MASS INDEX: 38.48 KG/M2 | SYSTOLIC BLOOD PRESSURE: 138 MMHG | HEART RATE: 93 BPM | DIASTOLIC BLOOD PRESSURE: 68 MMHG

## 2024-10-28 PROBLEM — R73.01 IFG (IMPAIRED FASTING GLUCOSE): Status: ACTIVE | Noted: 2024-10-28

## 2024-10-28 ASSESSMENT — ENCOUNTER SYMPTOMS: NAUSEA: 0

## 2024-10-28 NOTE — PATIENT INSTRUCTIONS
Weight Loss Plan/Recommendations  Obesity is a multifactorial disease - diet, exercise, medical conditions, mental health, and sleep all play a part in weight loss.       Nutrition  Meals:  I recommend 2-3 meals a day  Each meal should consist of protein, vegetables, and a healthy fat.   To achieve quicker weight loss, you can carb cycle by having a serving of fruit with each meal every other week.   I also recommend intermittent fasting - no breakfast (unless otherwise directed).   I recommend cutting out dairy, processed sugars, and processed foods.     Each Meal:   4-6 oz of cooked protein (lean meats such as fish, beef, chicken, turkey, shellfish, 3 eggs)  1-2 cups of vegetables   A healthy fat servin/2 Avocado OR 1/4 cup nuts OR 1 tbsp sugar free nut butter  Fruit servings: 1 cup of fruit or a whole apple/orange/banana etc.     Water:   Aim for 1/2 gallon - 1 gallon of water daily  Okay to use flavorizers initially (such as Clayton)   Tea okay  Coffee okay    Treat meal:   One meal a week - eat whatever you want!!! This helps allow you to save any cravings for one night a week and lets your body have extra carbohydrates to replenish itself.     Exercise  30 minutes of exercise 5 days a week of whatever you want! Any movement helps your body get stronger, increase your metabolism and lose weight.     Sleep  Recommend 6-8 hours nightly     Mental/Emotional  If you are struggling with depression, anxiety, history of trauma and we have not discussed this before - reach out.   1/3 of people who have obesity have had adverse childhood experiences or trauma.             How to Lose Weight and Keep It Off    In our eat-and-run, jaankfi-ihpbdpi-uwifc culture, maintaining a healthy weight can be tough--and losing weight, even tougher. If you’ve tried and failed to lose weight before, you may believe that diets don’t work for you. You’re probably right: some diets don’t work at all and none of them work for everyone--our

## 2024-11-26 ENCOUNTER — OFFICE VISIT (OUTPATIENT)
Dept: PRIMARY CARE CLINIC | Age: 59
End: 2024-11-26

## 2024-11-26 VITALS
SYSTOLIC BLOOD PRESSURE: 134 MMHG | BODY MASS INDEX: 37.5 KG/M2 | HEART RATE: 80 BPM | OXYGEN SATURATION: 96 % | DIASTOLIC BLOOD PRESSURE: 84 MMHG | WEIGHT: 192 LBS

## 2024-11-26 NOTE — PROGRESS NOTES
PROGRESS NOTE   [facility]                                  [address], [address], [place] [address]         Phone: [phone]    Date of Service:  11/26/2024     Patient ID: Yong Calderon is a 59 y.o. female      Subjective:     CC: Weight loss management    HPI    Patient is 59 year-old female who presents for follow up for weight management after starting semaglutide. Patient was last seen in office on 10/28/24 at which time she was increased to 1 mg weekly for semaglutide. She is down 4 lbs since her last appointment and approximately 13 lbs since starting her medication. She denies any abdominal pain, nausea, or vomiting.     ROS:    Constitutional:  Negative for activity or appetite change, fever or fatigue  HENT:  Negative for congestion, sinus pressure, or rhinorrhea  Eyes:  Negative for eye pain or visual changes  Resp:  Negative for SOB, chest tightness, cough  Cardiovascular: Negative for CP, palpitations, ANN, orthopnea, PND, LE edema  Gastrointestinal: Negative for abd pain, melena, BRBPR, N/V/D  Endocrine:  Negative for polydipsia and polyuria  :  Negative for dysuria, flank pain or urinary frequency  Musculoskeletal:  Negative for back pain or myalgias  Neuro:  Negative for dizziness or lightheadedness  Psych: negative for depression or anxiety        Vitals:    11/26/24 1619   BP: 134/84   Site: Left Upper Arm   Position: Sitting   Cuff Size: Large Adult   Pulse: 80   SpO2: 96%   Weight: 87.1 kg (192 lb)       Allergies:  Azithromycin and Codeine    Outpatient Medications Marked as Taking for the 11/26/24 encounter (Office Visit) with Car Mera MD   Medication Sig Dispense Refill    Semaglutide, 1 MG/DOSE, (OZEMPIC) 4 MG/3ML SOPN sc injection Inject 1.7 mg into the skin every 7 days Compound with b12 4 Adjustable Dose Pre-filled Pen Syringe 0    pantoprazole (PROTONIX) 40 MG tablet TAKE ONE TABLET

## 2024-12-26 ENCOUNTER — OFFICE VISIT (OUTPATIENT)
Dept: PRIMARY CARE CLINIC | Age: 59
End: 2024-12-26

## 2024-12-26 VITALS
BODY MASS INDEX: 36.87 KG/M2 | HEIGHT: 60 IN | WEIGHT: 187.8 LBS | SYSTOLIC BLOOD PRESSURE: 100 MMHG | HEART RATE: 78 BPM | DIASTOLIC BLOOD PRESSURE: 60 MMHG | OXYGEN SATURATION: 96 %

## 2024-12-26 DIAGNOSIS — C78.6 MALIGNANT NEOPLASM METASTATIC TO PERITONEUM (HCC): ICD-10-CM

## 2024-12-26 DIAGNOSIS — J44.9 CHRONIC OBSTRUCTIVE PULMONARY DISEASE, UNSPECIFIED COPD TYPE (HCC): ICD-10-CM

## 2024-12-26 DIAGNOSIS — Z23 NEED FOR INFLUENZA VACCINATION: ICD-10-CM

## 2024-12-26 DIAGNOSIS — I71.21 ANEURYSM OF ASCENDING AORTA WITHOUT RUPTURE (HCC): Primary | ICD-10-CM

## 2024-12-26 NOTE — PROGRESS NOTES
Preservative Free, 0.5mL          While assessing care for this patient, I have reviewed all pertinent lab work/imaging/ specialist notes and care in reference to those problems addressed above in detail. Appropriate medical decision making was based on this.     Please note that portions of this note may have been completed with a voice recognition program. Efforts were made to edit the dictations but occasionally words are mis-transcribed.    Return in about 4 weeks (around 1/23/2025) for weight check .    Mary Sandhu MD  12/27/2024

## 2025-02-06 ENCOUNTER — OFFICE VISIT (OUTPATIENT)
Dept: PRIMARY CARE CLINIC | Age: 60
End: 2025-02-06

## 2025-02-06 VITALS
HEART RATE: 75 BPM | DIASTOLIC BLOOD PRESSURE: 88 MMHG | HEIGHT: 60 IN | WEIGHT: 184.6 LBS | SYSTOLIC BLOOD PRESSURE: 122 MMHG | RESPIRATION RATE: 16 BRPM | BODY MASS INDEX: 36.24 KG/M2 | OXYGEN SATURATION: 97 %

## 2025-02-06 DIAGNOSIS — Z12.11 SCREENING FOR COLON CANCER: ICD-10-CM

## 2025-02-06 DIAGNOSIS — Z12.31 ENCOUNTER FOR SCREENING MAMMOGRAM FOR MALIGNANT NEOPLASM OF BREAST: ICD-10-CM

## 2025-02-06 DIAGNOSIS — C78.6 MALIGNANT NEOPLASM METASTATIC TO PERITONEUM (HCC): ICD-10-CM

## 2025-02-06 SDOH — ECONOMIC STABILITY: FOOD INSECURITY: WITHIN THE PAST 12 MONTHS, THE FOOD YOU BOUGHT JUST DIDN'T LAST AND YOU DIDN'T HAVE MONEY TO GET MORE.: NEVER TRUE

## 2025-02-06 SDOH — ECONOMIC STABILITY: FOOD INSECURITY: WITHIN THE PAST 12 MONTHS, YOU WORRIED THAT YOUR FOOD WOULD RUN OUT BEFORE YOU GOT MONEY TO BUY MORE.: NEVER TRUE

## 2025-02-06 ASSESSMENT — PATIENT HEALTH QUESTIONNAIRE - PHQ9
SUM OF ALL RESPONSES TO PHQ QUESTIONS 1-9: 0
SUM OF ALL RESPONSES TO PHQ9 QUESTIONS 1 & 2: 0
2. FEELING DOWN, DEPRESSED OR HOPELESS: NOT AT ALL
1. LITTLE INTEREST OR PLEASURE IN DOING THINGS: NOT AT ALL
SUM OF ALL RESPONSES TO PHQ QUESTIONS 1-9: 0

## 2025-02-06 NOTE — PROGRESS NOTES
2/6/2025    This is a 59 y.o. female who presents for  Chief Complaint   Patient presents with    Weight Management    Follow-up       HPI:     Weight  - starting weight 205  - down 21 pounds   - down 3 pounds in last 5 weeks  - 1.7 mg compounded semaglutide, doing well, no problems      She did have blood in her vagina, she called her gyn onc and she thinks it was trauma from switching to doing a exercise      No past medical history on file.    Past Surgical History:   Procedure Laterality Date    APPENDECTOMY      cancer was wrapped around it    HYSTERECTOMY, TOTAL ABDOMINAL (CERVIX REMOVED)         Social History     Socioeconomic History    Marital status:      Spouse name: Not on file    Number of children: Not on file    Years of education: Not on file    Highest education level: Not on file   Occupational History    Not on file   Tobacco Use    Smoking status: Former     Current packs/day: 0.25     Average packs/day: 0.3 packs/day for 30.0 years (7.5 ttl pk-yrs)     Types: Cigarettes    Smokeless tobacco: Never    Tobacco comments:     Working on quitting- States she is cutting back (2/2/23)   Vaping Use    Vaping status: Never Used   Substance and Sexual Activity    Alcohol use: Not Currently     Comment: Socially    Drug use: No    Sexual activity: Not on file   Other Topics Concern    Not on file   Social History Narrative    Not on file     Social Determinants of Health     Financial Resource Strain: Low Risk  (8/22/2024)    Overall Financial Resource Strain (CARDIA)     Difficulty of Paying Living Expenses: Not very hard   Food Insecurity: No Food Insecurity (2/6/2025)    Hunger Vital Sign     Worried About Running Out of Food in the Last Year: Never true     Ran Out of Food in the Last Year: Never true   Transportation Needs: No Transportation Needs (2/6/2025)    PRAPARE - Transportation     Lack of Transportation (Medical): No     Lack of Transportation (Non-Medical): No   Physical Activity:

## 2025-03-12 DIAGNOSIS — R12 HEARTBURN: ICD-10-CM

## 2025-03-12 RX ORDER — PANTOPRAZOLE SODIUM 40 MG/1
40 TABLET, DELAYED RELEASE ORAL
Qty: 90 TABLET | Refills: 1 | Status: SHIPPED | OUTPATIENT
Start: 2025-03-12

## 2025-03-12 NOTE — TELEPHONE ENCOUNTER
Refill Request     Last Seen: 2/6/2025    Last Written: 08/26/2024    Next Appointment:   Future Appointments   Date Time Provider Department Center   4/7/2025  9:30 AM Mary Sandhu MD [facility] DEP             Requested Prescriptions     Pending Prescriptions Disp Refills    pantoprazole (PROTONIX) 40 MG tablet 90 tablet 1     Sig: Take 1 tablet by mouth every morning (before breakfast)

## 2025-03-25 ENCOUNTER — ANESTHESIA EVENT (OUTPATIENT)
Dept: ENDOSCOPY | Age: 60
End: 2025-03-25
Payer: COMMERCIAL

## 2025-03-25 NOTE — ANESTHESIA PRE PROCEDURE
Department of Anesthesiology  Preprocedure Note       Name:  Chrystal Calderon   Age:  59 y.o.  :  1965                                          MRN:  [id number]         Date:  3/25/2025      Surgeon: Surgeon(s):  Fadi Tipton MD    Procedure: Procedure(s):  COLONOSCOPY    Medications prior to admission:   Prior to Admission medications    Medication Sig Start Date End Date Taking? Authorizing Provider   pantoprazole (PROTONIX) 40 MG tablet Take 1 tablet by mouth every morning (before breakfast) 3/12/25   Car Mera MD   fluticasone (FLONASE) 50 MCG/ACT nasal spray SPRAY TWO SPRAYS IN EACH NOSTRIL ONCE DAILY 24   Lona Gonzáles DO   ipratropium 0.5 mg-albuterol 2.5 mg (DUONEB) 0.5-2.5 (3) MG/3ML SOLN nebulizer solution Take 3 mLs by nebulization every 6 hours as needed for Shortness of Breath 23   Marga Braun DO   albuterol sulfate HFA (VENTOLIN HFA) 108 (90 Base) MCG/ACT inhaler Inhale 2 puffs into the lungs every 4 hours as needed for Wheezing 23   Marga Braun DO   Respiratory Therapy Supplies (NEBULIZER/TUBING/MOUTHPIECE) KIT 1 kit by Does not apply route daily as needed (q6h prn) 23   Marga Braun DO   ondansetron (ZOFRAN-ODT) 4 MG disintegrating tablet Take 1 tablet by mouth 3 times daily as needed for Nausea or Vomiting 22   Kareem Childress DO       Current medications:    No current facility-administered medications for this encounter.     Current Outpatient Medications   Medication Sig Dispense Refill    pantoprazole (PROTONIX) 40 MG tablet Take 1 tablet by mouth every morning (before breakfast) 90 tablet 1    fluticasone (FLONASE) 50 MCG/ACT nasal spray SPRAY TWO SPRAYS IN EACH NOSTRIL ONCE DAILY 1 each 4    ipratropium 0.5 mg-albuterol 2.5 mg (DUONEB) 0.5-2.5 (3) MG/3ML SOLN nebulizer solution Take 3 mLs by nebulization every 6 hours as needed for Shortness of Breath 360 mL 0    albuterol sulfate HFA (VENTOLIN HFA) 108 (90 Base) MCG/ACT inhaler Inhale 2 puffs into

## 2025-03-26 ENCOUNTER — ANESTHESIA (OUTPATIENT)
Dept: ENDOSCOPY | Age: 60
End: 2025-03-26
Payer: COMMERCIAL

## 2025-03-26 ENCOUNTER — HOSPITAL ENCOUNTER (OUTPATIENT)
Age: 60
Setting detail: OUTPATIENT SURGERY
Discharge: HOME | End: 2025-03-26
Attending: INTERNAL MEDICINE | Admitting: INTERNAL MEDICINE
Payer: COMMERCIAL

## 2025-03-26 VITALS
TEMPERATURE: 99 F | HEART RATE: 85 BPM | HEIGHT: 60 IN | RESPIRATION RATE: 16 BRPM | WEIGHT: 179 LBS | OXYGEN SATURATION: 97 % | DIASTOLIC BLOOD PRESSURE: 54 MMHG | SYSTOLIC BLOOD PRESSURE: 120 MMHG | BODY MASS INDEX: 35.14 KG/M2

## 2025-03-26 DIAGNOSIS — Z12.11 COLON CANCER SCREENING: ICD-10-CM

## 2025-03-26 DIAGNOSIS — Z80.0 FAMILY HX OF COLON CANCER: ICD-10-CM

## 2025-03-26 PROCEDURE — 7100000010 HC PHASE II RECOVERY - FIRST 15 MIN: Performed by: INTERNAL MEDICINE

## 2025-03-26 PROCEDURE — 6360000002 HC RX W HCPCS: Performed by: NURSE ANESTHETIST, CERTIFIED REGISTERED

## 2025-03-26 PROCEDURE — 3609010600 HC COLONOSCOPY POLYPECTOMY SNARE/COLD BIOPSY: Performed by: INTERNAL MEDICINE

## 2025-03-26 PROCEDURE — 7100000011 HC PHASE II RECOVERY - ADDTL 15 MIN: Performed by: INTERNAL MEDICINE

## 2025-03-26 PROCEDURE — 2580000003 HC RX 258: Performed by: ANESTHESIOLOGY

## 2025-03-26 PROCEDURE — 88305 TISSUE EXAM BY PATHOLOGIST: CPT

## 2025-03-26 PROCEDURE — 3700000000 HC ANESTHESIA ATTENDED CARE: Performed by: INTERNAL MEDICINE

## 2025-03-26 PROCEDURE — 3700000001 HC ADD 15 MINUTES (ANESTHESIA): Performed by: INTERNAL MEDICINE

## 2025-03-26 PROCEDURE — 2709999900 HC NON-CHARGEABLE SUPPLY: Performed by: INTERNAL MEDICINE

## 2025-03-26 RX ORDER — SODIUM CHLORIDE 0.9 % (FLUSH) 0.9 %
5-40 SYRINGE (ML) INJECTION PRN
Status: DISCONTINUED | OUTPATIENT
Start: 2025-03-26 | End: 2025-03-26 | Stop reason: HOSPADM

## 2025-03-26 RX ORDER — SODIUM CHLORIDE, SODIUM LACTATE, POTASSIUM CHLORIDE, CALCIUM CHLORIDE 600; 310; 30; 20 MG/100ML; MG/100ML; MG/100ML; MG/100ML
INJECTION, SOLUTION INTRAVENOUS CONTINUOUS
Status: DISCONTINUED | OUTPATIENT
Start: 2025-03-26 | End: 2025-03-26 | Stop reason: HOSPADM

## 2025-03-26 RX ORDER — SODIUM CHLORIDE 9 MG/ML
INJECTION, SOLUTION INTRAVENOUS PRN
Status: DISCONTINUED | OUTPATIENT
Start: 2025-03-26 | End: 2025-03-26 | Stop reason: HOSPADM

## 2025-03-26 RX ORDER — LIDOCAINE HYDROCHLORIDE 20 MG/ML
INJECTION, SOLUTION INFILTRATION; PERINEURAL
Status: DISCONTINUED | OUTPATIENT
Start: 2025-03-26 | End: 2025-03-26 | Stop reason: SDUPTHER

## 2025-03-26 RX ORDER — SODIUM CHLORIDE 0.9 % (FLUSH) 0.9 %
5-40 SYRINGE (ML) INJECTION EVERY 12 HOURS SCHEDULED
Status: DISCONTINUED | OUTPATIENT
Start: 2025-03-26 | End: 2025-03-26 | Stop reason: HOSPADM

## 2025-03-26 RX ORDER — PROPOFOL 10 MG/ML
INJECTION, EMULSION INTRAVENOUS
Status: DISCONTINUED | OUTPATIENT
Start: 2025-03-26 | End: 2025-03-26 | Stop reason: SDUPTHER

## 2025-03-26 RX ADMIN — LIDOCAINE HYDROCHLORIDE 60 MG: 20 INJECTION, SOLUTION INFILTRATION; PERINEURAL at 08:31

## 2025-03-26 RX ADMIN — PROPOFOL 60 MG: 10 INJECTION, EMULSION INTRAVENOUS at 08:31

## 2025-03-26 RX ADMIN — SODIUM CHLORIDE, SODIUM LACTATE, POTASSIUM CHLORIDE, AND CALCIUM CHLORIDE: .6; .31; .03; .02 INJECTION, SOLUTION INTRAVENOUS at 08:04

## 2025-03-26 RX ADMIN — PROPOFOL 150 MCG/KG/MIN: 10 INJECTION, EMULSION INTRAVENOUS at 08:29

## 2025-03-26 ASSESSMENT — PAIN - FUNCTIONAL ASSESSMENT: PAIN_FUNCTIONAL_ASSESSMENT: 0-10

## 2025-03-26 ASSESSMENT — PAIN SCALES - GENERAL: PAINLEVEL_OUTOF10: 0

## 2025-03-26 NOTE — H&P
[facility]   Pre-operative History and Physical    Patient: Chrystal Calderon  : 1965  Acct#:     HISTORY OF PRESENT ILLNESS:    The patient is a 59 y.o. female who presents for colon cancer screening     Indications: colon cancer screening     Past Medical History:        Diagnosis Date    Acid reflux     Aortic aneurysm     Hiatal hernia     Ovarian cancer (HCC)       Past Surgical History:        Procedure Laterality Date    APPENDECTOMY      cancer was wrapped around it    HYSTERECTOMY, TOTAL ABDOMINAL (CERVIX REMOVED)        Medications Prior to Admission:   No current facility-administered medications on file prior to encounter.     Current Outpatient Medications on File Prior to Encounter   Medication Sig Dispense Refill    fluticasone (FLONASE) 50 MCG/ACT nasal spray SPRAY TWO SPRAYS IN EACH NOSTRIL ONCE DAILY 1 each 4    ipratropium 0.5 mg-albuterol 2.5 mg (DUONEB) 0.5-2.5 (3) MG/3ML SOLN nebulizer solution Take 3 mLs by nebulization every 6 hours as needed for Shortness of Breath 360 mL 0    albuterol sulfate HFA (VENTOLIN HFA) 108 (90 Base) MCG/ACT inhaler Inhale 2 puffs into the lungs every 4 hours as needed for Wheezing 18 g 0    Respiratory Therapy Supplies (NEBULIZER/TUBING/MOUTHPIECE) KIT 1 kit by Does not apply route daily as needed (q6h prn) 1 kit 0    ondansetron (ZOFRAN-ODT) 4 MG disintegrating tablet Take 1 tablet by mouth 3 times daily as needed for Nausea or Vomiting 21 tablet 0        Allergies:  Azithromycin and Codeine    Social History:   Social History     Socioeconomic History    Marital status:      Spouse name: Not on file    Number of children: Not on file    Years of education: Not on file    Highest education level: Not on file   Occupational History    Not on file   Tobacco Use    Smoking status: Former     Current packs/day: 0.00     Average packs/day: 0.3 packs/day for 22.0 years (5.5 ttl pk-yrs)     Types: Cigarettes     Start date: 3/1/2002     Quit date:

## 2025-03-26 NOTE — DISCHARGE INSTRUCTIONS
PATIENT INSTRUCTIONS  POST-SEDATION          IMMEDIATELY FOLLOWING PROCEDURE:    Do not drive or operate machinery for the first twenty four hours after surgery.     Do not make any important decisions for twenty four hours after surgery or while taking narcotic pain medications or sedatives.     You should NOT BE LEFT UNATTENDED OR ALONE. A responsible adult should be with you for the rest of the day of your procedure and also during the night for your protection and safety.    You may experience some light headedness. Rest at home with activity as tolerated. You may not need to go to bed, but it is important to rest for the next 24 hours. You should not engage in athletic sports such as basketball, volleyball, jogging, skating, or activities requiring refined motor skills for 24 hours.   If you develop intractable nausea and vomiting or a severe headache please notify your doctor immediately.   You are not expected to have any fever, but if you feel warm, take your temperature. If you have a fever 101 degrees or higher, call your doctor.     If you have had an Endoscopy:   *Eat lightly for your first meal and gradually resume your normal / prescribed diet.    *If you have had a colonoscopy, do not expect a normal bowel movement for approximately three days due to the cleansing of the large intestine prior to colonoscopy.    ONCE YOU ARE HOME, IF YOU SHOULD HAVE:  Difficulty in breathing, persistent nausea or vomiting, bleeding you feel is excessive, or pain that is unusual, increased abdominal bloating, or any swelling, fever / chills, call your physician. If you cannot contact your physician, but feel that your signs and symptoms need a physician's attention, go to the Emergency Department.      FOLLOW-UP:    Please follow up with your Primary Care Provider as scheduled or needed.    Call Fadi Clay MD if there are any GI concerns. [phone]    Repeat Colonoscopy in 5 years.    You may be receiving a

## 2025-03-26 NOTE — PROCEDURES
Colonoscopy Procedure  Note          Patient: Chrystal Calderon  : 1965      Procedure: Colonoscopy with cold snare polypectomy    Date:  3/26/2025    Primary Care Physician: Mary Sandhu MD     Operative surgeon: Fadi Tipton MD  Previous Colonoscopy: None  Consent: I explained and discussed the risk, benefits and alternatives for the procedure with the patient and obtained the patient's consent for the procedure. We discussed the specific risks including bleeding, perforation, post-procedure abdominal pain, and missed lesions which could lead to interval colorectal cancers.      History:       Past Medical History:   Diagnosis Date    Acid reflux     Aortic aneurysm     Hiatal hernia     Ovarian cancer (HCC)       Preoperative Diagnosis: Colon cancer screening [Z12.11]  Family hx of colon cancer [Z80.0]  Post Operative Diagnosis: Colon polyps, diverticulosis, internal hemorrhoids  ASA: 2  SEDATION: MAC      Procedures Performed: Colonoscopy   Scope Type: Pediatric    Procedure Details:      With the patient in left lateral decubitus position the endoscope was inserted through the anorectal area into the rectum. The scope was then advanced through the length of the colon to the cecum. The quality of preparation was good.  The scope was carefully withdrawn with careful inspection. Images were taken of the multiple segments of colon, cecum, IC valve, rectum. Retroflexion was preformed in the rectum.  2 separate passes were made in the right colon.       Cecum Intubated: Yes  EBL: minimal to none  Complications:  no complications were noted  Post-operative Findings: Perianal exam demonstrated hemorrhoids, digital rectal exam retroflexion rectum demonstrated internal hemorrhoids    Sigmoid diverticulosis mild fixation of the left colon.    In the transverse colon there is a 6 mm sessile polyp removed with cold snare resection and retrieval complete.    In the cecum there was a 5 mm polyp removed with cold

## 2025-03-26 NOTE — ANESTHESIA POSTPROCEDURE EVALUATION
Department of Anesthesiology  Postprocedure Note    Patient: Chrystal Calderon  MRN: [id number]  YOB: 1965  Date of evaluation: 3/26/2025    Procedure Summary       Date: 03/26/25 Room / Location: [place] [address] / [facility]    Anesthesia Start: 0826 Anesthesia Stop: 0853    Procedure: COLONOSCOPY POLYPECTOMY SNARE/BIOPSY Diagnosis:       Colon cancer screening      Family hx of colon cancer      (Colon cancer screening [Z12.11])      (Family hx of colon cancer [Z80.0])    Surgeons: Fadi Tipton MD Responsible Provider: Mateusz Danielle MD    Anesthesia Type: MAC ASA Status: 3            Anesthesia Type: MAC    Moe Phase I: Moe Score: 10    Moe Phase II: Moe Score: 10    Anesthesia Post Evaluation    Comments: Postoperative Anesthesia Note    Name:    Chrystal Calderon  MRN:      [id number]    Patient Vitals in the past 12 hrs:  03/26/25 0910, BP:(!) 120/54, Pulse:85, Resp:16  03/26/25 0900, BP:(!) 141/79, Pulse:87, Resp:16, SpO2:97 %  03/26/25 0845, BP:(!) 133/112, Pulse:95, Resp:16, SpO2:95 %  03/26/25 0800, BP:(!) 140/90, Temp:99 °F (37.2 °C), Temp src:Oral, Pulse:(!) 107, Resp:16, SpO2:94 %     LABS:    CBC  Lab Results       Component                Value               Date/Time                  WBC                      5.9                 10/24/2024 09:04 AM        HGB                      16.0                10/24/2024 09:04 AM        HCT                      47.0                10/24/2024 09:04 AM        PLT                      294                 10/24/2024 09:04 AM   RENAL  Lab Results       Component                Value               Date/Time                  NA                       138                 10/24/2024 09:04 AM        K                        5.1                 10/24/2024 09:04 AM        K                        3.1 (L)             04/25/2022 05:35 PM        CL                       100                 10/24/2024 09:04 AM        CO2                      27

## 2025-03-26 NOTE — PROGRESS NOTES
Awake and alert with no complaints. VS stable. Discharge instructions reviewed with patient/responsible adult and understanding verbalized. Discharge instructions copies given. Discharge criteria met per hospital policy. Patient discharged home with belongings.     
Chrystal Yumiko    Age 59 y.o.    female    1965    MRN [id number]    3/26/2025  Arrival Time_____________  OR Time____________30 Min     Procedure(s):  COLONOSCOPY                      Monitor Anesthesia Care    Surgeon(s):  Fadi Tipton N, MD       Phone [phone] (home)     Initals  Date  Info Source  Home  Cell         Work  _____________________________________________________________________  _____________________________________________________________________  _____________________________________________________________________  _____________________________________________________________________  _____________________________________________________________________    PCP _____________________________ Phone_________________     H&P  ________________  Bringing      Chart              Epic      DOS      Called________  EKG ________________   Bringing      Chart              Epic      DOS      Called________  LABS________________   Bringing     Chart              Epic      DOS      Called________  Cardiac Clearance ______ Bringing      Chart              Epic      DOS      Called________  Pulmonary Clearance____ Bringing      Chart              Epic      DOS      Called________    Cardiologist________________________ Phone___________________________  Pulmonologist_______________________Phone___________________________    ? Advance Directives   ? Spiritism concerns / Waiver on Chart            [employer]________________  ? Pre-op Instructions Given /Understood          _________________________________  ? Directions to Surgery Center                          _________________________________  ? Transportation Home_______________      __________________________________  ? Crutches/Walker__________________        __________________________________    Orders: Hard copy/ EPIC                 Transcribed/ EPIC              _______Wt.    ________Pharmacy                         _______SCD                  
Pt arrived to pacu from sx.  vSS.  Pt drowsy and awake  
notify your surgeon if you experience dizziness, shortness of breath or blurred vision between now & the time of your surgery  To provide excellent care visitors will be limited to two per room at any given time. No visitors under the age of 14.  If you use oxygen and have a portable tank please bring it with you the DOS  For your convenience [facility] has a pharmacy on site to fill your prescriptions.     *Please call pre admission testing if you have any further questions             [facility]      [phone]                            Address: [address] [place] [address]     When you pull into the hospital and are looking at the main hospital entrance, turn right.   We are a tan building to the right of the main entrance.   [facility] over the door.  .                                                        Revision History

## 2025-04-07 ENCOUNTER — OFFICE VISIT (OUTPATIENT)
Dept: PRIMARY CARE CLINIC | Age: 60
End: 2025-04-07
Payer: COMMERCIAL

## 2025-04-07 VITALS
HEIGHT: 60 IN | DIASTOLIC BLOOD PRESSURE: 74 MMHG | BODY MASS INDEX: 35.18 KG/M2 | SYSTOLIC BLOOD PRESSURE: 114 MMHG | RESPIRATION RATE: 14 BRPM | OXYGEN SATURATION: 98 % | HEART RATE: 88 BPM | WEIGHT: 179.2 LBS

## 2025-04-07 DIAGNOSIS — Z00.00 ANNUAL PHYSICAL EXAM: Primary | ICD-10-CM

## 2025-04-07 DIAGNOSIS — E66.813 CLASS 3 SEVERE OBESITY DUE TO EXCESS CALORIES WITHOUT SERIOUS COMORBIDITY WITH BODY MASS INDEX (BMI) OF 40.0 TO 44.9 IN ADULT: ICD-10-CM

## 2025-04-07 DIAGNOSIS — R11.0 NAUSEA: ICD-10-CM

## 2025-04-07 DIAGNOSIS — Z78.0 POST-MENOPAUSAL: ICD-10-CM

## 2025-04-07 DIAGNOSIS — M25.819 IMPINGEMENT OF SHOULDER: ICD-10-CM

## 2025-04-07 PROCEDURE — 99396 PREV VISIT EST AGE 40-64: CPT | Performed by: STUDENT IN AN ORGANIZED HEALTH CARE EDUCATION/TRAINING PROGRAM

## 2025-04-07 RX ORDER — ONDANSETRON 4 MG/1
4 TABLET, FILM COATED ORAL DAILY PRN
Qty: 30 TABLET | Refills: 0 | Status: SHIPPED | OUTPATIENT
Start: 2025-04-07

## 2025-04-07 NOTE — PROGRESS NOTES
Comments: Decreased ROM 2/2 pain FF, full passive rom, negative empty can test, positive cordero test right side   Skin:     General: Skin is warm and dry.   Neurological:      General: No focal deficit present.      Mental Status: She is alert.   Psychiatric:         Mood and Affect: Mood normal.         Assessment/Plan:  1. Annual physical exam  Overall doing well, dexa ordered today, chronic conditions under control.    2. Post-menopausal  - DEXA BONE DENSITY AXIAL SKELETON; Future    3. Class 3 severe obesity due to excess calories without serious comorbidity with body mass index (BMI) of 40.0 to 44.9 in adult  - semaglutide, 2 MG/DOSE, (OZEMPIC) 8 MG/3ML SOPN sc injection; Inject 2 mg into the skin every 7 days  Dispense: 3 mL; Refill: 1  - improving    4. Impingement of shoulder  - relapsing remitting flairs for past several months, instructed to use antiinflammatory meds, PT, may need ortho if no improvement.  - [facility])-[facility]    5. Nausea  - ondansetron (ZOFRAN) 4 MG tablet; Take 1 tablet by mouth daily as needed for Nausea or Vomiting  Dispense: 30 tablet; Refill: 0      While assessing care for this patient, I have reviewed all pertinent lab work/imaging/ specialist notes and care in reference to those problems addressed above in detail. Appropriate medical decision making was based on this.     Please note that portions of this note may have been completed with a voice recognition program. Efforts were made to edit the dictations but occasionally words are mis-transcribed.      Return in about 6 months (around 10/7/2025).    Mary Sandhu MD  4/11/2025

## 2025-04-15 ENCOUNTER — HOSPITAL ENCOUNTER (OUTPATIENT)
Dept: PHYSICAL THERAPY | Age: 60
Setting detail: THERAPIES SERIES
Discharge: HOME | End: 2025-04-15
Attending: STUDENT IN AN ORGANIZED HEALTH CARE EDUCATION/TRAINING PROGRAM
Payer: COMMERCIAL

## 2025-04-15 DIAGNOSIS — M25.511 RIGHT SHOULDER PAIN, UNSPECIFIED CHRONICITY: Primary | ICD-10-CM

## 2025-04-15 PROCEDURE — 97110 THERAPEUTIC EXERCISES: CPT

## 2025-04-15 PROCEDURE — 97161 PT EVAL LOW COMPLEX 20 MIN: CPT

## 2025-04-15 NOTE — PLAN OF CARE
facilitate improvement in movement, function, and ADLs as indicated by Functional Deficits.  [] Progressing: [] Met: [] Not Met: [] Adjusted      Long Term Goals: To be achieved in: 8 weeks  Disability index score of 15% or less for the Quick DASH to assist with reaching prior level of function with activities such as washing her back/hygiene.  [] Progressing: [] Met: [] Not Met: [] Adjusted  Patient will demonstrate increased AROM of shoulder abduction to 165-170 without pain to allow for proper joint functioning to enable patient to reach overhead to cabinet for dishes.   [] Progressing: [] Met: [] Not Met: [] Adjusted  Patient will demonstrate increased Strength of R shoulder abduction and ER to at least 4/5 throughout without pain to allow for proper functional mobility to enable patient to return to reaching up for seatbelt.   [] Progressing: [] Met: [] Not Met: [] Adjusted  Patient will return to lifting her grandson without increased symptoms or restriction 75% of the time.   [] Progressing: [] Met: [] Not Met: [] Adjusted  Patient will be able to pull up pants and dress fully without increased symptoms or restriction. (patient specific functional goal)    [] Progressing: [] Met: [] Not Met: [] Adjusted       Overall Progression Towards Functional goals/ Treatment Progress Update:  [] Patient is progressing as expected towards functional goals listed.    [] Progression is slowed due to complexities/Impairments listed.  [] Progression has been slowed due to co-morbidities.  [x] Plan just implemented, too soon (<30days) to assess goals progression   [] Goals require adjustment due to lack of progress  [] Patient is not progressing as expected and requires additional follow up with physician  [] Other:     TREATMENT PLAN     Frequency/Duration: 1-2x/week for 8 weeks for the following treatment interventions:    Interventions:  Therapeutic Exercise (39291) including: strength training, ROM, and functional

## 2025-04-21 ENCOUNTER — HOSPITAL ENCOUNTER (OUTPATIENT)
Dept: WOMENS IMAGING | Age: 60
Discharge: HOME | End: 2025-04-21
Attending: STUDENT IN AN ORGANIZED HEALTH CARE EDUCATION/TRAINING PROGRAM
Payer: COMMERCIAL

## 2025-04-21 VITALS — HEIGHT: 60 IN | WEIGHT: 177 LBS | BODY MASS INDEX: 34.75 KG/M2

## 2025-04-21 DIAGNOSIS — Z78.0 POST-MENOPAUSAL: ICD-10-CM

## 2025-04-21 DIAGNOSIS — Z12.31 ENCOUNTER FOR SCREENING MAMMOGRAM FOR MALIGNANT NEOPLASM OF BREAST: ICD-10-CM

## 2025-04-21 PROCEDURE — 77067 SCR MAMMO BI INCL CAD: CPT

## 2025-04-21 PROCEDURE — 77080 DXA BONE DENSITY AXIAL: CPT

## 2025-04-22 ENCOUNTER — APPOINTMENT (OUTPATIENT)
Dept: WOMENS IMAGING | Age: 60
End: 2025-04-22
Attending: STUDENT IN AN ORGANIZED HEALTH CARE EDUCATION/TRAINING PROGRAM
Payer: COMMERCIAL

## 2025-04-22 ENCOUNTER — RESULTS FOLLOW-UP (OUTPATIENT)
Dept: PRIMARY CARE CLINIC | Age: 60
End: 2025-04-22

## 2025-04-23 ENCOUNTER — HOSPITAL ENCOUNTER (OUTPATIENT)
Dept: PHYSICAL THERAPY | Age: 60
Setting detail: THERAPIES SERIES
Discharge: HOME | End: 2025-04-23
Attending: STUDENT IN AN ORGANIZED HEALTH CARE EDUCATION/TRAINING PROGRAM
Payer: COMMERCIAL

## 2025-04-23 PROCEDURE — 97110 THERAPEUTIC EXERCISES: CPT

## 2025-04-23 PROCEDURE — 97140 MANUAL THERAPY 1/> REGIONS: CPT

## 2025-04-23 NOTE — FLOWSHEET NOTE
[facility] - Outpatient Rehabilitation and Therapy: [address], [place] [address] office: [phone] fax: [phone]       Physical Therapy: TREATMENT/PROGRESS NOTE   Patient: Chrystal Calderon (59 y.o. female)   Examination Date: 2025   :  1965 MRN: [id number]   Visit #: 2   Insurance Allowable Auth Needed   30 []Yes-after 30    []No    Insurance: Payor: [employer] [place] / Plan: [employer] [place] / Product Type: *No Product type* /   Insurance ID: [id number] - (Medicaid Managed)  Secondary Insurance (if applicable):    Treatment Diagnosis:     ICD-10-CM    1. Right shoulder pain, unspecified chronicity  M25.511          Medical Diagnosis:  Impingement of shoulder [M25.819]   Referring Physician: Mary Sandhu MD  PCP: Mary Sandhu MD     Plan of care signed (Y/N):     Date of Patient follow up with Physician: has not seen ortho at this time; f/u with PCP in May     Plan of Care Report: NO  POC update due: (10 visits /OR AUTH LIMITS, whichever is less)  5/15/2025                                             Medical History:  Comorbidities:  Cancer/Tumor  Anxiety  Aortic aneurysm  Relevant Medical History: hysterectomy                                         Precautions/ Contra-indications:           Latex allergy:  NO  Pacemaker:    NO  Contraindications for Manipulation: NA  Date of Surgery: NA  Other:    Red Flags:  None    Suicide Screening:   The patient did not verbalize a primary behavioral concern, suicidal ideation, suicidal intent, or demonstrate suicidal behaviors.    Preferred Language for Healthcare:   [x] English       [] other:    SUBJECTIVE EXAMINATION     Patient stated complaint: Patient reports improvement in symptoms since 1st visit and is very pleased with how much better shoulder felt. Woke up today painfree. Catches herself stretching and doing shoulder blade squeezes in seated multiple times throughout the day. Said she has not felt

## 2025-04-25 ENCOUNTER — RESULTS FOLLOW-UP (OUTPATIENT)
Dept: PRIMARY CARE CLINIC | Age: 60
End: 2025-04-25

## 2025-05-02 ENCOUNTER — HOSPITAL ENCOUNTER (OUTPATIENT)
Dept: PHYSICAL THERAPY | Age: 60
Setting detail: THERAPIES SERIES
Discharge: HOME | End: 2025-05-02
Attending: STUDENT IN AN ORGANIZED HEALTH CARE EDUCATION/TRAINING PROGRAM
Payer: COMMERCIAL

## 2025-05-02 PROCEDURE — 97140 MANUAL THERAPY 1/> REGIONS: CPT

## 2025-05-02 PROCEDURE — 97110 THERAPEUTIC EXERCISES: CPT

## 2025-05-02 NOTE — FLOWSHEET NOTE
(48102)     Iontophoresis (59336)    VASO (20528)     Ultrasound (94656)    Group Therapy (48221)     Estim Attended (06687)    Canalith Repositioning (31384)     Physical Performance Test (34135)    Custom orthotic ()     Other:    Other: CP 8' end of session    Total Timed Code Tx Minutes 39' 3       Total Treatment Minutes 47'        Charge Justification:  (74828) THERAPEUTIC EXERCISE - Provided verbal/tactile cueing for HEP and/or activities related to strengthening, flexibility, endurance, ROM performed to prevent loss of range of motion, maintain or improve muscular strength or increase flexibility, following either an injury or surgery.   (38501) MANUAL THERAPY -  Manual therapy techniques, 1 or more regions, each 15 minutes (Mobilization/manipulation, manual lymphatic drainage, manual traction) for the purpose of modulating pain, promoting relaxation,  increasing ROM, reducing/eliminating soft tissue swelling/inflammation/restriction, improving soft tissue extensibility and allowing for proper ROM for normal function with self care, mobility, lifting and ambulation    GOALS     Patient stated goal: help relieve shoulder pain with daily activities  [] Progressing: [] Met: [] Not Met: [] Adjusted    Therapist goals for Patient:   Short Term Goals: To be achieved in: 2 weeks  1Independent in HEP and progression per patient tolerance, in order to prevent re-injury.   [x] Progressing: [] Met: [] Not Met: [] Adjusted  Patient will have a decrease in pain to <2/10 to facilitate improvement in movement, function, and ADLs as indicated by Functional Deficits.  [x] Progressing: [] Met: [] Not Met: [] Adjusted      Long Term Goals: To be achieved in: 8 weeks  Disability index score of 15% or less for the Quick DASH to assist with reaching prior level of function with activities such as washing her back/hygiene.  [] Progressing: [] Met: [] Not Met: [] Adjusted  Patient will demonstrate increased AROM of shoulder

## 2025-05-07 ENCOUNTER — APPOINTMENT (OUTPATIENT)
Dept: PHYSICAL THERAPY | Age: 60
End: 2025-05-07
Attending: STUDENT IN AN ORGANIZED HEALTH CARE EDUCATION/TRAINING PROGRAM
Payer: COMMERCIAL

## 2025-05-08 ENCOUNTER — HOSPITAL ENCOUNTER (OUTPATIENT)
Dept: PHYSICAL THERAPY | Age: 60
Setting detail: THERAPIES SERIES
Discharge: HOME | End: 2025-05-08
Attending: STUDENT IN AN ORGANIZED HEALTH CARE EDUCATION/TRAINING PROGRAM
Payer: COMMERCIAL

## 2025-05-08 DIAGNOSIS — H69.92 ETD (EUSTACHIAN TUBE DYSFUNCTION), LEFT: ICD-10-CM

## 2025-05-08 PROCEDURE — 97110 THERAPEUTIC EXERCISES: CPT

## 2025-05-08 PROCEDURE — 97140 MANUAL THERAPY 1/> REGIONS: CPT

## 2025-05-08 NOTE — FLOWSHEET NOTE
[facility] - Outpatient Rehabilitation and Therapy: [address], [place] [address] office: [phone] fax: [phone]       Physical Therapy: TREATMENT/PROGRESS NOTE   Patient: Chrystal Calderon (59 y.o. female)   Examination Date: 2025   :  1965 MRN: [id number]   Visit #: 4   Insurance Allowable Auth Needed   30 []Yes-after 30    []No    Insurance: Payor: [employer] [place] / Plan: [employer] [place] / Product Type: *No Product type* /   Insurance ID: [id number] - (Medicaid Managed)  Secondary Insurance (if applicable):    Treatment Diagnosis:     ICD-10-CM    1. Right shoulder pain, unspecified chronicity  M25.511          Medical Diagnosis:  Impingement of shoulder [M25.819]   Referring Physician: Mary Sandhu MD  PCP: Mary Sandhu MD     Plan of care signed (Y/N):     Date of Patient follow up with Physician: has not seen ortho at this time; f/u with PCP in May     Plan of Care Report: NO  POC update due: (10 visits /OR AUTH LIMITS, whichever is less)  5/15/2025                                             Medical History:  Comorbidities:  Cancer/Tumor  Anxiety  Aortic aneurysm  Relevant Medical History: hysterectomy                                         Precautions/ Contra-indications:           Latex allergy:  NO  Pacemaker:    NO  Contraindications for Manipulation: NA  Date of Surgery: NA  Other:    Red Flags:  None    Suicide Screening:   The patient did not verbalize a primary behavioral concern, suicidal ideation, suicidal intent, or demonstrate suicidal behaviors.    Preferred Language for Healthcare:   [x] English       [] other:    SUBJECTIVE EXAMINATION     Patient stated complaint: Patient said she had a great week with no shoulder pain, doing HEP 2x/day. 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  The shoulder was throbbing at night but after tylenol and heat she was able to sleep. Not in

## 2025-05-09 RX ORDER — FLUTICASONE PROPIONATE 50 MCG
SPRAY, SUSPENSION (ML) NASAL
Qty: 16 ML | OUTPATIENT
Start: 2025-05-09

## 2025-05-14 ENCOUNTER — APPOINTMENT (OUTPATIENT)
Dept: PHYSICAL THERAPY | Age: 60
End: 2025-05-14
Attending: STUDENT IN AN ORGANIZED HEALTH CARE EDUCATION/TRAINING PROGRAM
Payer: COMMERCIAL

## 2025-05-15 ENCOUNTER — OFFICE VISIT (OUTPATIENT)
Dept: PRIMARY CARE CLINIC | Age: 60
End: 2025-05-15
Payer: COMMERCIAL

## 2025-05-15 VITALS
HEIGHT: 60 IN | DIASTOLIC BLOOD PRESSURE: 90 MMHG | RESPIRATION RATE: 18 BRPM | HEART RATE: 90 BPM | BODY MASS INDEX: 34.57 KG/M2 | OXYGEN SATURATION: 97 % | SYSTOLIC BLOOD PRESSURE: 126 MMHG

## 2025-05-15 DIAGNOSIS — E66.9 OBESITY (BMI 30-39.9): Primary | ICD-10-CM

## 2025-05-15 PROCEDURE — 99213 OFFICE O/P EST LOW 20 MIN: CPT | Performed by: STUDENT IN AN ORGANIZED HEALTH CARE EDUCATION/TRAINING PROGRAM

## 2025-05-15 RX ORDER — POLYETHYLENE GLYCOL 3350, SODIUM SULFATE, SODIUM CHLORIDE, POTASSIUM CHLORIDE, ASCORBIC ACID, SODIUM ASCORBATE 140-9-5.2G
KIT ORAL
COMMUNITY
End: 2025-05-15

## 2025-05-15 NOTE — PROGRESS NOTES
NAME:   Chrystal Calderon  :     1965  AGE:     59 y.o.  SEX:      female    CC: Weight Management (6 week follow up)    A/P    1. Obesity (BMI 30-39.9)  -improving  -discussed alternative options to compounded glp1a  -informed of self-pay zepbound option  -discussed other meds including phentermine, contrave, trulicity  -intensify lifestyle modifications  -[email or web address], [employer], [employer], Ro    Return in about 6 weeks (around 2025) for wt f/u.    Subjective       HPI    The patient is here for weight management follow-up. She's currently on compounded semaglutide/b12, 2 mg qw through [facility]. She took her last dose of injection last night. She started glp1a since Sept of last year and has since lost 31 lbs. Pt would like to continue glp1a wt loss med for foreseeable future and wanted to discuss other options.    ROS    Relevant mentioned above.        Objective       BP (!) 126/90 (BP Site: Left Upper Arm, Patient Position: Sitting, BP Cuff Size: Medium Adult)   Pulse 90   Resp 18   Ht 1.524 m (5')   LMP 2016   SpO2 97%   BMI 34.57 kg/m²      PE    GEN  -well nourished, well developed, no distress, obese       HEENT  -no trouble breathing through nose  CV  -normal S1/S2  PULM  -CTAB  ABD  -non-distended  MSK  -normal ROM of all four extremities  SKIN  -no facial rash  NEURO  -alert, no focal deficit  PSYCH  -normal insight and judgement        Victor M Mackey MD PhD

## 2025-05-21 ENCOUNTER — HOSPITAL ENCOUNTER (OUTPATIENT)
Dept: PHYSICAL THERAPY | Age: 60
Setting detail: THERAPIES SERIES
Discharge: HOME | End: 2025-05-21
Attending: STUDENT IN AN ORGANIZED HEALTH CARE EDUCATION/TRAINING PROGRAM
Payer: COMMERCIAL

## 2025-05-21 NOTE — PLAN OF CARE
[facility] - Outpatient Rehabilitation and Therapy: [address], [place] [address] office: [phone] fax: [phone]    Physical Therapy Re-Certification Plan of Care    Dear Mary Sandhu MD  ,    We had the pleasure of treating the following patient for physical therapy services at [facility]. A summary of our findings can be found in the updated assessment below.  This includes our plan of care.  If you have any questions or concerns regarding these findings, please do not hesitate to contact me at the office phone number checked above.  Thank you for the referral.     Physician Signature:________________________________Date:__________________  By signing above (or electronic signature), therapist's plan is approved by physician      Total Visits: 5     Overall Response to Treatment:  Patient is responding well to treatment and improvement is noted with regards to goals    Recommendation:    [x] Continue PT ***x / wk for *** weeks.   [] Hold PT, pending MD visit   [] Discharge to [facility]. Follow up with PT or MD PRN.       Physical Therapy: TREATMENT/PROGRESS NOTE   Patient: Chrystal Calderon (59 y.o. female)   Examination Date: 2025   :  1965 MRN: [id number]   Visit #: 5   Insurance Allowable Auth Needed   30 []Yes-after 30    []No    Insurance: Payor: [employer] [place] / Plan: [employer] [place] / Product Type: *No Product type* /   Insurance ID: [id number] - (Medicaid Managed)  Secondary Insurance (if applicable):    Treatment Diagnosis:     ICD-10-CM    1. Right shoulder pain, unspecified chronicity  M25.511          Medical Diagnosis:  Impingement of shoulder [M25.819]   Referring Physician: Mary Sandhu MD  PCP: Mary Sandhu MD     Plan of care signed (Y/N):     Date of Patient follow up with Physician: has not seen ortho at this time; f/u with PCP in May     Plan of Care Report: YES, Date Range for this report: 4/15/25

## 2025-05-27 ENCOUNTER — OFFICE VISIT (OUTPATIENT)
Dept: PRIMARY CARE CLINIC | Age: 60
End: 2025-05-27
Payer: COMMERCIAL

## 2025-05-27 VITALS
OXYGEN SATURATION: 98 % | HEART RATE: 90 BPM | BODY MASS INDEX: 31.18 KG/M2 | DIASTOLIC BLOOD PRESSURE: 80 MMHG | SYSTOLIC BLOOD PRESSURE: 140 MMHG | WEIGHT: 176 LBS | RESPIRATION RATE: 16 BRPM | HEIGHT: 63 IN

## 2025-05-27 DIAGNOSIS — G43.011 INTRACTABLE MIGRAINE WITHOUT AURA AND WITH STATUS MIGRAINOSUS: Primary | ICD-10-CM

## 2025-05-27 PROCEDURE — 99214 OFFICE O/P EST MOD 30 MIN: CPT | Performed by: STUDENT IN AN ORGANIZED HEALTH CARE EDUCATION/TRAINING PROGRAM

## 2025-05-27 RX ORDER — SUMATRIPTAN SUCCINATE 25 MG/1
25 TABLET ORAL DAILY PRN
COMMUNITY
Start: 2025-05-21 | End: 2025-05-27

## 2025-05-27 RX ORDER — SUMATRIPTAN 50 MG/1
50 TABLET, FILM COATED ORAL
Qty: 10 TABLET | Refills: 1 | Status: SHIPPED | OUTPATIENT
Start: 2025-05-27

## 2025-05-27 RX ORDER — TOPIRAMATE 25 MG/1
25 TABLET, FILM COATED ORAL NIGHTLY
Qty: 60 TABLET | Refills: 3 | Status: SHIPPED | OUTPATIENT
Start: 2025-05-27

## 2025-05-27 NOTE — PROGRESS NOTES
NAME:   Chrystal Calderon  :     1965  AGE:     59 y.o.  SEX:      female    CC: Follow-up and Migraine (Pt is out of rx for the migraines from urgent care not fel like they're coming back )    A/P    1. Intractable migraine without aura and with status migrainosus  -uncontrolled  -prescribed ubrogepant, anticipate PA with potential for denial  -start topiramate 25 mg qhs  -increase sumatriptan to 50 mg prn  -reassess in upcoming follow-up appointment    - Ubrogepant 100 MG TABS; Take 100 mg by mouth as needed (migraine)  Dispense: 16 tablet; Refill: 1  - SUMAtriptan (IMITREX) 50 MG tablet; Take 1 tablet by mouth once as needed for Migraine  Dispense: 10 tablet; Refill: 1  - topiramate (TOPAMAX) 25 MG tablet; Take 1 tablet by mouth nightly  Dispense: 60 tablet; Refill: 3    Return in about 5 weeks (around 2025) for cc f/u.    Subjective       HPI    Migraine since last tue, first couple days were daily    Went to [facility] - received 6 days of sumatriptan, effective    Migraine coming again since stopping sumatriptan    25 years ago had migraine but since resolved but return since last few weeks    Woke up from headache    Sharp on top of your head    Unilateral left sided    Sumatriptan provided relief    Head CT was non-acute    No aura    Took family member's ubrogepant which was very effective    ROS    Relevant mentioned above.        Objective       BP (!) 140/80 (BP Site: Left Upper Arm, Patient Position: Sitting, BP Cuff Size: Medium Adult)   Pulse 90   Resp 16   Ht 1.6 m (5' 3\")   Wt 79.8 kg (176 lb)   LMP 2016   SpO2 98%   BMI 31.18 kg/m²      PE    GEN  -well nourished, well developed, no distress       HEENT  -no trouble breathing through nose  CV  -normal S1/S2  PULM  -CTAB  ABD  -non-distended  MSK  -normal ROM of all four extremities  SKIN  -no facial rash  NEURO  -alert, no focal deficit  PSYCH  -normal insight and judgement        Victor M Mackey MD PhD

## 2025-05-28 ENCOUNTER — HOSPITAL ENCOUNTER (OUTPATIENT)
Dept: PHYSICAL THERAPY | Age: 60
Setting detail: THERAPIES SERIES
Discharge: HOME | End: 2025-05-28
Attending: STUDENT IN AN ORGANIZED HEALTH CARE EDUCATION/TRAINING PROGRAM
Payer: COMMERCIAL

## 2025-05-28 PROCEDURE — 97140 MANUAL THERAPY 1/> REGIONS: CPT

## 2025-05-28 PROCEDURE — 97110 THERAPEUTIC EXERCISES: CPT

## 2025-05-28 NOTE — PLAN OF CARE
such as washing her back/hygiene.  [] Progressing: [x] Met: [] Not Met: [] Adjusted  Patient will demonstrate increased AROM of shoulder abduction to 165-170 without pain to allow for proper joint functioning to enable patient to reach overhead to cabinet for dishes.   [] Progressing: [x] Met: [] Not Met: [] Adjusted  Patient will demonstrate increased Strength of R shoulder abduction and ER to at least 4/5 throughout without pain to allow for proper functional mobility to enable patient to return to reaching up for seatbelt.   [] Progressing: [x] Met: [] Not Met: [] Adjusted  Patient will return to lifting her grandson without increased symptoms or restriction 75% of the time.   [] Progressing: [x] Met: [] Not Met: [] Adjusted  Patient will be able to pull up pants and dress fully without increased symptoms or restriction. (patient specific functional goal)    [] Progressing: [x] Met: [] Not Met: [] Adjusted       Overall Progression Towards Functional goals/ Treatment Progress Update:  [x] Patient is progressing as expected towards functional goals listed.    [] Progression is slowed due to complexities/Impairments listed.  [] Progression has been slowed due to co-morbidities.  [] Plan just implemented, too soon (<30days) to assess goals progression   [] Goals require adjustment due to lack of progress  [] Patient is not progressing as expected and requires additional follow up with physician  [] Other:     TREATMENT PLAN     Frequency/Duration: 1-2x/week for 8 weeks for the following treatment interventions:    Interventions:  Therapeutic Exercise (14343) including: strength training, ROM, and functional mobility  Therapeutic Activities (66290) including: functional mobility training and education.  Neuromuscular Re-education (19587) activation and proprioception, including postural re-education.    Manual Therapy (49698) as indicated to include: Passive Range of Motion, Gr I-IV mobilizations, Soft Tissue

## 2025-05-29 ENCOUNTER — TELEPHONE (OUTPATIENT)
Dept: ADMINISTRATIVE | Age: 60
End: 2025-05-29

## 2025-05-29 NOTE — TELEPHONE ENCOUNTER
Patient is calling and stating that her RX   Ubrogepant 100 MG TABS  is needing a PA. Please advise [phone]

## 2025-05-30 NOTE — TELEPHONE ENCOUNTER
Submitted PA for Ubrelvy 100MG tablets   Via CM (Key: [id number]) STATUS: PENDING.    Follow up done daily; if no decision with in three days we will refax.  If another three days goes by with no decision will call the insurance for status.

## 2025-06-02 NOTE — TELEPHONE ENCOUNTER
The medication was DENIED; DENIAL letter is uploaded to MEDIA.    Generic Denial: Patient must complete step therapy, Unless there is a contraindication that you can provide.     Note :    Coverage is provided when the member has met the step therapy requirement for this medication. Step therapy is a type of prior authorization that requires that you try one or more preferred drugs before you are approved for the drug requested. The requested medication requires the member to have a history of at least 14 days of therapy with at least two preferred medications in this UPDL category OR documentation why the member is unable to take the medication not requiring step therapy, which include but are not limited to: Imitrex Nasal Spray, naratriptan tablets, rizatriptan (oral disintegrating tablets and tablets), and sumatriptan (injection, nasal spray, and tablets).      If you want an APPEAL; please note in this encounter what new information you would like to APPEAL with.  Once complete route back to PA POOL.    If this requires a response please respond to the pool ( P MHCX PSC MEDICATION PRE-AUTH).      Thank you please advise patient.

## 2025-06-06 ENCOUNTER — OFFICE VISIT (OUTPATIENT)
Dept: PRIMARY CARE CLINIC | Age: 60
End: 2025-06-06
Payer: COMMERCIAL

## 2025-06-06 VITALS
DIASTOLIC BLOOD PRESSURE: 86 MMHG | OXYGEN SATURATION: 98 % | SYSTOLIC BLOOD PRESSURE: 128 MMHG | TEMPERATURE: 97.9 F | BODY MASS INDEX: 30.68 KG/M2 | WEIGHT: 173.2 LBS | HEART RATE: 88 BPM

## 2025-06-06 DIAGNOSIS — G43.E19 INTRACTABLE CHRONIC MIGRAINE WITH AURA AND WITHOUT STATUS MIGRAINOSUS: Primary | ICD-10-CM

## 2025-06-06 DIAGNOSIS — H69.92 ETD (EUSTACHIAN TUBE DYSFUNCTION), LEFT: ICD-10-CM

## 2025-06-06 PROCEDURE — 99214 OFFICE O/P EST MOD 30 MIN: CPT

## 2025-06-06 RX ORDER — RIZATRIPTAN BENZOATE 10 MG/1
10 TABLET ORAL
Qty: 20 TABLET | Refills: 0 | Status: SHIPPED | OUTPATIENT
Start: 2025-06-06

## 2025-06-06 RX ORDER — FLUTICASONE PROPIONATE 50 MCG
2 SPRAY, SUSPENSION (ML) NASAL DAILY PRN
Qty: 1 EACH | Refills: 4 | Status: SHIPPED | OUTPATIENT
Start: 2025-06-06

## 2025-06-06 NOTE — PROGRESS NOTES
PROGRESS NOTE   [facility]                                  [address], [address], [facility] [address]         Phone: [phone]    Date of Service:  6/6/2025     Patient ID: Yong Calderon is a 59 y.o. female      Subjective:     CC:   Chief Complaint   Patient presents with    Migraine     Pt has been taking sumatriptan along with ibuprofen and tylenol for migraines w/o relief. Insurance will not cover ubrelvy but this med does work for her.        HPI  Patient is 59-year-old female presenting to the office today for follow-up    Patient was seen last week for intractable migraine. Her sumatriptan was also increased to 50 mg as needed.    Notes the imitrex will occasionally work. Takes that, plus ibuprofen and extra strength tylenol - works occasionally.     Has tried some Ubrelvy from daughter with relief.     Headache mainly top of scalp  Occurs suddenly, will gradually worsen, light bothers headache   is full care, works as caregiver, notes headaches come on randomly during work    Take sumatriptan for 4 more days, then  new prescription of rizatriptan take for at least 14 days    ROS:    Review of Systems   All other systems reviewed and are negative.    Vitals:    06/06/25 1041   BP: 128/86   BP Site: Left Upper Arm   Patient Position: Sitting   BP Cuff Size: Small Adult   Pulse: 88   Temp: 97.9 °F (36.6 °C)   TempSrc: Oral   SpO2: 98%   Weight: 78.6 kg (173 lb 3.2 oz)       Allergies:  Azithromycin and Codeine    Outpatient Medications Marked as Taking for the 6/6/25 encounter (Office Visit) with Pablito Suarez, DO   Medication Sig Dispense Refill    rizatriptan (MAXALT) 10 MG tablet Take 1 tablet by mouth once as needed for Migraine May repeat in 2 hours if needed 20 tablet 0    fluticasone (FLONASE) 50 MCG/ACT nasal spray 2 sprays by Nasal route daily as needed for Rhinitis 1 each 4    SUMAtriptan (IMITREX) 50 MG

## 2025-07-01 ENCOUNTER — OFFICE VISIT (OUTPATIENT)
Dept: PRIMARY CARE CLINIC | Age: 60
End: 2025-07-01
Payer: COMMERCIAL

## 2025-07-01 VITALS
DIASTOLIC BLOOD PRESSURE: 64 MMHG | WEIGHT: 170.8 LBS | HEART RATE: 92 BPM | OXYGEN SATURATION: 97 % | BODY MASS INDEX: 30.26 KG/M2 | SYSTOLIC BLOOD PRESSURE: 118 MMHG

## 2025-07-01 DIAGNOSIS — G43.011 INTRACTABLE MIGRAINE WITHOUT AURA AND WITH STATUS MIGRAINOSUS: ICD-10-CM

## 2025-07-01 PROCEDURE — 99213 OFFICE O/P EST LOW 20 MIN: CPT | Performed by: STUDENT IN AN ORGANIZED HEALTH CARE EDUCATION/TRAINING PROGRAM

## 2025-07-01 RX ORDER — TOPIRAMATE 50 MG/1
50 TABLET, FILM COATED ORAL NIGHTLY
Qty: 90 TABLET | Refills: 3 | Status: SHIPPED | OUTPATIENT
Start: 2025-07-01

## 2025-07-01 NOTE — PROGRESS NOTES
2025    This is a 60 y.o. female who presents for  Chief Complaint   Patient presents with    Follow-up     Follow up on weight and migraine   Migraines- come and go   Weight- starting to lose again          HPI:     Migraines  - maxalt 10 mg   - imitrex didn't help at all  - topamax 25 mg - taking every night    - feels like since being on this med, she has been having much less frequent headaches, just recently had one migraine after several weeks, so may need to increase  - ubrogepant - insurance denied      Weight BMI >30  - pt's daughter's friend is an NP, writing for her for the compounded semaglutide, increased the dose to the maximum dosage, will have her take over this in the future        Past Medical History:   Diagnosis Date    Acid reflux     Aortic aneurysm     Hiatal hernia     Ovarian cancer (HCC)        Past Surgical History:   Procedure Laterality Date    APPENDECTOMY      cancer was wrapped around it    COLONOSCOPY N/A 2025    COLONOSCOPY POLYPECTOMY SNARE/BIOPSY performed by Fadi Tipton MD at [facility]    HYSTERECTOMY, TOTAL ABDOMINAL (CERVIX REMOVED)      OVARY REMOVAL         Social History     Socioeconomic History    Marital status:      Spouse name: Not on file    Number of children: Not on file    Years of education: Not on file    Highest education level: Not on file   Occupational History    Not on file   Tobacco Use    Smoking status: Former     Current packs/day: 0.00     Average packs/day: 0.3 packs/day for 22.0 years (5.5 ttl pk-yrs)     Types: Cigarettes     Start date: 3/1/2002     Quit date: 3/1/2024     Years since quittin.3    Smokeless tobacco: Never   Vaping Use    Vaping status: Never Used   Substance and Sexual Activity    Alcohol use: Not Currently    Drug use: No    Sexual activity: Not on file   Other Topics Concern    Not on file   Social History Narrative    Not on file     Social Drivers of Health     Financial Resource Strain: Low Risk

## 2025-08-19 ENCOUNTER — OFFICE VISIT (OUTPATIENT)
Dept: ENT CLINIC | Age: 60
End: 2025-08-19
Payer: COMMERCIAL

## 2025-08-19 ENCOUNTER — PROCEDURE VISIT (OUTPATIENT)
Dept: AUDIOLOGY | Age: 60
End: 2025-08-19
Payer: COMMERCIAL

## 2025-08-19 VITALS
WEIGHT: 167 LBS | DIASTOLIC BLOOD PRESSURE: 86 MMHG | BODY MASS INDEX: 29.59 KG/M2 | HEIGHT: 63 IN | SYSTOLIC BLOOD PRESSURE: 124 MMHG | HEART RATE: 63 BPM

## 2025-08-19 DIAGNOSIS — H90.3 SENSORINEURAL HEARING LOSS, BILATERAL: Primary | ICD-10-CM

## 2025-08-19 DIAGNOSIS — H93.12 TINNITUS OF LEFT EAR: ICD-10-CM

## 2025-08-19 DIAGNOSIS — H93.13 TINNITUS OF BOTH EARS: ICD-10-CM

## 2025-08-19 DIAGNOSIS — R42 DIZZINESS AND GIDDINESS: ICD-10-CM

## 2025-08-19 DIAGNOSIS — H90.3 SENSORINEURAL HEARING LOSS (SNHL), BILATERAL: Primary | ICD-10-CM

## 2025-08-19 PROCEDURE — 99214 OFFICE O/P EST MOD 30 MIN: CPT | Performed by: OTOLARYNGOLOGY

## 2025-08-19 PROCEDURE — 92557 COMPREHENSIVE HEARING TEST: CPT | Performed by: AUDIOLOGIST

## 2025-08-19 PROCEDURE — 92567 TYMPANOMETRY: CPT | Performed by: AUDIOLOGIST

## 2025-08-19 ASSESSMENT — ENCOUNTER SYMPTOMS
SINUS PRESSURE: 0
FACIAL SWELLING: 0
SORE THROAT: 0
VOICE CHANGE: 0
TROUBLE SWALLOWING: 0
APNEA: 0
COUGH: 0
EYE ITCHING: 0
SHORTNESS OF BREATH: 0

## (undated) DEVICE — ELECTRODE ECG MONITR FOAM TEAR DROP ADLT RED

## (undated) DEVICE — SNARE ENDOSCP L240CM SHTH DIA24MM LOOP W10MM POLYP RND REINF

## (undated) DEVICE — TRAP SPEC POLYPR SGL CHMBR FN MESH SCRN

## (undated) DEVICE — ENDOSCOPIC KIT 2 12 FT OP4 DE2 GWN SYR

## (undated) DEVICE — MASK CAPNOGRAPHY AD W35IN DIA58IN SAMP LN L10FT O2 LN